# Patient Record
Sex: FEMALE | Race: WHITE | NOT HISPANIC OR LATINO | Employment: PART TIME | URBAN - METROPOLITAN AREA
[De-identification: names, ages, dates, MRNs, and addresses within clinical notes are randomized per-mention and may not be internally consistent; named-entity substitution may affect disease eponyms.]

---

## 2017-01-20 ENCOUNTER — ALLSCRIPTS OFFICE VISIT (OUTPATIENT)
Dept: OTHER | Facility: OTHER | Age: 57
End: 2017-01-20

## 2017-05-12 ENCOUNTER — ALLSCRIPTS OFFICE VISIT (OUTPATIENT)
Dept: OTHER | Facility: OTHER | Age: 57
End: 2017-05-12

## 2017-08-04 ENCOUNTER — ALLSCRIPTS OFFICE VISIT (OUTPATIENT)
Dept: OTHER | Facility: OTHER | Age: 57
End: 2017-08-04

## 2017-11-03 ENCOUNTER — ALLSCRIPTS OFFICE VISIT (OUTPATIENT)
Dept: OTHER | Facility: OTHER | Age: 57
End: 2017-11-03

## 2018-01-10 NOTE — PSYCH
Psych Med Mgmt    Appearance: was calm and cooperative  Observed mood: mood appropriate  Observed mood: affect appropriate  Speech: a normal rate  Thought processes: coherent/organized  Hallucinations: no hallucinations present  Thought Content: no delusions  Abnormal Thoughts: The patient has no suicidal thoughts and no homicidal thoughts  Orientation: The patient is oriented to person, place and time  Recent and Remote Memory: short term memory intact and long term memory intact  Attention Span And Concentration: concentration intact  Insight: Insight intact  Judgment: Her judgment was intact  Treatment Recommendations: Follow up in 3-4 months  Same meds  The patient has been filling controlled [facility]       She reports normal appetite, normal energy level and no weight change  Mood is stable  Work is going well  No new concerns  No med side effects  Continue current meds  Follow up iin 3-4 months  Vitals  Signs   Recorded: 41EYH5594 15:62CU   Systolic: 457  Diastolic: 74  Height: 5 ft 8 in  Weight: 184 lb   BMI Calculated: 27 98  BSA Calculated: 1 97    DSM    Provisional Diagnosis: Major Depression  ADD  GAF--70  Assessment    1  ADD (attention deficit disorder) without hyperactivity (314 00) (F90 0)   2  Depression with anxiety (300 4) (F41 8)    Plan    1  Methylphenidate HCl - 20 MG Oral Tablet (Ritalin); TAKE 1 TABLET 4 TIMES   DAILY    2  Citalopram Hydrobromide 10 MG Oral Tablet (CeleXA); TAKE 1 TABLET DAILY    3  ALPRAZolam 2 MG Oral Tablet (Xanax); TAKE 1 TABLET Other QID and 1 PRN    Active Problems    1  ADD (attention deficit disorder) without hyperactivity (314 00) (F90 0)   2  Chronic depression (311) (F32 9)   3  Depression with anxiety (300 4) (F41 8)    Allergies    1  No Known Drug Allergies    Current Meds   1   ALPRAZolam 2 MG Oral Tablet; TAKE 1 TABLET Other QID and 1 PRN; Therapy: 63BYP9313 to (Evaluate:01Nov2016); Last Rx:18Ejd9071 Ordered   2  Citalopram Hydrobromide 10 MG Oral Tablet; TAKE 1 TABLET DAILY; Therapy: 61TTC2220 to (Evaluate:01Nov2016); Last Rx:59Dce9179 Ordered   3  Methylphenidate HCl - 20 MG Oral Tablet; TAKE 1 TABLET 4 TIMES DAILY; Therapy: 49FNP7137 to (Evaluate:02Sep2016); Last Rx:50Tsp1813 Ordered    Family Psych History  Mother    1  Family history of Anxiety and depression  Father    2  Family history of Bipolar affective    Social History    · Current every day smoker (305 1) (F17 200)   · Single    End of Encounter Meds    1  Methylphenidate HCl - 20 MG Oral Tablet (Ritalin); TAKE 1 TABLET 4 TIMES DAILY; Therapy: 14WPD2041 to (Evaluate:27Nov2016); Last Rx:28Oct2016 Ordered    2  Citalopram Hydrobromide 10 MG Oral Tablet (CeleXA); TAKE 1 TABLET DAILY; Therapy: 14TFX3964 to (Evaluate:01Nov2016); Last Rx:03Cqb8539 Ordered    3  ALPRAZolam 2 MG Oral Tablet (Xanax); TAKE 1 TABLET Other QID and 1 PRN; Therapy: 09RFM6263 to (Evaluate:26Jan2017);  Last Rx:28Oct2016 Ordered    Signatures   Electronically signed by : Silvestre Schwartz; Oct 28 2016  2:47PM EST                       (Author)    Electronically signed by : Riccardo Martel MD; Oct 31 2016  3:40PM EST

## 2018-01-12 NOTE — PSYCH
Psych Med Mgmt    Appearance: was calm and cooperative and good eye contact  Observed mood: mood appropriate  Observed mood: affect was constricted, but affect appropriate  Speech: a normal rate  Thought processes: coherent/organized  Hallucinations: no hallucinations present  Thought Content: no delusions  Abnormal Thoughts: The patient has no suicidal thoughts and no homicidal thoughts  Orientation: The patient is oriented to person, place and time  Recent and Remote Memory: short term memory intact and long term memory intact  Attention Span And Concentration: concentration intact  Insight: Insight intact  Judgment: Her judgment was intact  Treatment Recommendations: Follow up in 4 months    Same meds  She reports normal appetite, normal energy level and no weight change  Doing very well  No new clinical issues or concerns  Mood is good  Off Celexa for Daylight Savings Time  Will restart in Fall  Continue current meds  Vitals  Signs [Data Includes: Current Encounter]   Recorded: 31CDS7179 74:56RM   Systolic: 987  Diastolic: 76  Height: 5 ft 8 in  Weight: 184 lb   BMI Calculated: 27 98  BSA Calculated: 1 97    DSM    Provisional Diagnosis: Major Depression  ADD  GAF--70  Assessment    1  ADD (attention deficit disorder) without hyperactivity (314 00) (F90 0)   2  Depression with anxiety (300 4) (F41 8)    Plan    1  Methylphenidate HCl - 20 MG Oral Tablet (Ritalin); TAKE 1 TABLET 4 TIMES   DAILY    2  ALPRAZolam 2 MG Oral Tablet (Xanax); TAKE 1 TABLET Other QID and 1 PRN    Active Problems    1  ADD (attention deficit disorder) without hyperactivity (314 00) (F90 0)   2  Chronic depression (311) (F32 9)   3  Depression with anxiety (300 4) (F41 8)    Allergies    1  No Known Drug Allergies    Current Meds   1  ALPRAZolam 2 MG Oral Tablet; TAKE 1 TABLET Other QID and 1 PRN; Therapy: 37MAM1297 to (Evaluate:14Apr2016); Last Rx:15Jan2016 Ordered   2   Citalopram Hydrobromide 10 MG Oral Tablet; TAKE 1 TABLET DAILY; Therapy: 64KYV8739 to (Evaluate:14Apr2016); Last Rx:15Jan2016 Ordered   3  Methylphenidate HCl - 20 MG Oral Tablet; TAKE 1 TABLET 4 TIMES DAILY; Therapy: 69LMO1917 to (Evaluate:84Tvw9047); Last Rx:15Jan2016 Ordered    Family Psych History    1  Family history of Anxiety and depression    2  Family history of Bipolar affective    Social History    · Current every day smoker (305 1) (F17 200)   · Single    End of Encounter Meds    1  Methylphenidate HCl - 20 MG Oral Tablet (Ritalin); TAKE 1 TABLET 4 TIMES DAILY; Therapy: 36AXI4631 to (Evaluate:62Eyn5097); Last Rx:15Apr2016 Ordered    2  Citalopram Hydrobromide 10 MG Oral Tablet (CeleXA); TAKE 1 TABLET DAILY; Therapy: 03CBQ6305 to (Evaluate:89Tvk7567); Last Rx:15Jan2016 Ordered    3  ALPRAZolam 2 MG Oral Tablet (Xanax); TAKE 1 TABLET Other QID and 1 PRN; Therapy: 62FMI6120 to (Evaluate:39Eqn5794); Last Rx:15Apr2016 Ordered    Signatures   Electronically signed by : Lennox Kiel;  Apr 15 2016  4:35PM EST                       (Author)    Electronically signed by : Brunilda Vidal MD; Apr 18 2016  3:32PM EST

## 2018-01-12 NOTE — PSYCH
Psych Med Mgmt    Appearance: was calm and cooperative  Observed mood: mood appropriate  Observed mood: affect appropriate  Speech: a normal rate  Thought processes: coherent/organized  Hallucinations: no hallucinations present  Thought Content: no delusions  Abnormal Thoughts: The patient has no suicidal thoughts  Orientation: The patient is oriented to person, place and time  Recent and Remote Memory: short term memory intact and long term memory intact  Attention Span And Concentration: concentration intact  Insight: Insight intact  Judgment: Her judgment was intact  Treatment Recommendations: Follow up in 3 months  She reports normal appetite, normal energy level and no weight change  Doing very well  Taking antidepressant regularly and is more interested, less sullen and more active at work and home  No med side effects  No suicidal thoughts  Follow up in 3 months  Vitals  Signs [Data Includes: Current Encounter]   Recorded: 48IHH6330 70:41AO   Systolic: 733  Diastolic: 76  Height: 5 ft 8 in  Weight: 184 lb   BMI Calculated: 27 98  BSA Calculated: 1 97    DSM    Provisional Diagnosis: Major Depression with no Psychosis  ADD    GAF--70  Assessment    1  ADD (attention deficit disorder) without hyperactivity (314 00) (F90 0)   2  Depression with anxiety (300 4) (F41 8)    Plan    1  Methylphenidate HCl - 20 MG Oral Tablet (Ritalin)   2  Methylphenidate HCl - 20 MG Oral Tablet (Ritalin); TAKE 1 TABLET 4 TIMES   DAILY    3  Citalopram Hydrobromide 10 MG Oral Tablet (CeleXA); TAKE 1 TABLET DAILY    4  ALPRAZolam 2 MG Oral Tablet (Xanax); TAKE 1 TABLET Other QID and 1 PRN    Active Problems    1  ADD (attention deficit disorder) without hyperactivity (314 00) (F90 0)   2  Chronic depression (311) (F32 9)   3  Depression with anxiety (300 4) (F41 8)    Allergies    1  No Known Drug Allergies    Current Meds   1   ALPRAZolam 2 MG Oral Tablet; TAKE 1 TABLET Other QID and 1 PRN;   Therapy: 46VWR2496 to (Evaluate:19Jan2016); Last Rx:21Oct2015 Ordered   2  Citalopram Hydrobromide 10 MG Oral Tablet; TAKE 1 TABLET DAILY; Therapy: 47CTY8956 to (Evaluate:19Jan2016); Last Rx:21Oct2015 Ordered   3  Methylphenidate HCl - 20 MG Oral Tablet; TAKE 1 TABLET 4 TIMES DAILY; Therapy: 57PFA0998 to (Evaluate:20Nov2015); Last Rx:21Oct2015 Ordered   4  Methylphenidate HCl - 20 MG Oral Tablet; TAKE 1 TABLET 4 TIMES DAILY; Therapy: 64ITX0177 to (Evaluate:20Nov2015); Last Rx:21Oct2015 Ordered    Family Psych History    1  Family history of Anxiety and depression    2  Family history of Bipolar affective    Social History    · Current every day smoker (305 1) (F17 200)   · Single    End of Encounter Meds    1  Methylphenidate HCl - 20 MG Oral Tablet (Ritalin); TAKE 1 TABLET 4 TIMES DAILY; Therapy: 13TCW2153 to (Evaluate:24Vhj5544); Last Rx:15Jan2016 Ordered    2  Citalopram Hydrobromide 10 MG Oral Tablet (CeleXA); TAKE 1 TABLET DAILY; Therapy: 32ADN3720 to (Evaluate:14Apr2016); Last Rx:15Jan2016 Ordered    3  ALPRAZolam 2 MG Oral Tablet (Xanax); TAKE 1 TABLET Other QID and 1 PRN; Therapy: 87JQK7025 to (Evaluate:14Apr2016);  Last Rx:15Jan2016 Ordered    Signatures   Electronically signed by : Shun Phillip; Thomas 15 2016  5:10PM EST                       (Author)    Electronically signed by : Gladys Ansari MD; Jan 18 2016  3:29PM EST

## 2018-01-12 NOTE — PSYCH
Psych Med Mgmt    Appearance: was calm and cooperative and good eye contact  Observed mood: mood appropriate  Observed mood: affect appropriate  Speech: a normal rate  Thought processes: coherent/organized  Hallucinations: no hallucinations present  Thought Content: no delusions  Abnormal Thoughts: The patient has no suicidal thoughts and no homicidal thoughts  Orientation: The patient is oriented to person, place and time  Recent and Remote Memory: short term memory intact and long term memory intact  Attention Span And Concentration: concentration intact  Insight: Insight intact  Judgment: Her judgment was intact  Treatment Recommendations: Follow up in 3 months  Same meds  She reports normal appetite, normal energy level and no weight change  Doing well  Works daily  No issues  Tolerates meds well  No new clinical issues or concerns  Tolerates all meds well  No overuse noted  Continue current treatment  Vitals  Signs   Recorded: 78GJN5977 34:49AU   Systolic: 586  Diastolic: 74  Height: 5 ft 8 in  Weight: 184 lb   BMI Calculated: 27 98  BSA Calculated: 1 97    DSM    Provisional Diagnosis: Major Depression  ADD  GAF--70  Assessment    1  Chronic depression (311) (F32 9)    Plan    1  Methylphenidate HCl - 20 MG Oral Tablet (Ritalin); TAKE 1 TABLET 4 TIMES   DAILY    2  Citalopram Hydrobromide 10 MG Oral Tablet (CeleXA); TAKE 1 TABLET DAILY    3  ALPRAZolam 2 MG Oral Tablet (Xanax); TAKE 1 TABLET Other QID and 1 PRN    Active Problems    1  ADD (attention deficit disorder) without hyperactivity (314 00) (F90 0)   2  Chronic depression (311) (F32 9)   3  Depression with anxiety (300 4) (F41 8)    Allergies    1  No Known Drug Allergies    Current Meds   1  ALPRAZolam 2 MG Oral Tablet; TAKE 1 TABLET Other QID and 1 PRN; Therapy: 37URT1255 to (Evaluate:75Kyh3256); Last Rx:15Apr2016 Ordered   2  Citalopram Hydrobromide 10 MG Oral Tablet; TAKE 1 TABLET DAILY;    Therapy: 99EBU4685 to (Evaluate:14Apr2016); Last Rx:15Jan2016 Ordered   3  Methylphenidate HCl - 20 MG Oral Tablet; TAKE 1 TABLET 4 TIMES DAILY; Therapy: 89WYG0175 to (Evaluate:97Zbv4138); Last Rx:15Apr2016 Ordered    Family Psych History  Mother    1  Family history of Anxiety and depression  Father    2  Family history of Bipolar affective    Social History    · Current every day smoker (305 1) (F17 200)   · Single    End of Encounter Meds    1  Methylphenidate HCl - 20 MG Oral Tablet (Ritalin); TAKE 1 TABLET 4 TIMES DAILY; Therapy: 73BYT5597 to (Evaluate:07Yzs0108); Last Rx:51Fij1862 Ordered    2  Citalopram Hydrobromide 10 MG Oral Tablet (CeleXA); TAKE 1 TABLET DAILY; Therapy: 86VOW6721 to (Evaluate:01Nov2016); Last Rx:79Pmo0262 Ordered    3  ALPRAZolam 2 MG Oral Tablet (Xanax); TAKE 1 TABLET Other QID and 1 PRN; Therapy: 21JWM4043 to (Evaluate:01Nov2016); Last Rx:93Kfl4953 Ordered    Future Appointments    Date/Time Provider Specialty Site   10/28/2016 02:30 PM Agus Mckenzie, [facility]  Psychiatry [facility] DR BROOKE ARGUELLES     Signatures   Electronically signed by : YASMANY Cao;  Aug  3 2016  4:55PM EST                       (Author)    Electronically signed by : Sally Galeano MD; Aug  9 2016  3:18PM EST

## 2018-01-15 NOTE — PSYCH
Psych Med Mgmt    Appearance: was calm and cooperative and good eye contact  Observed mood: mood appropriate  Observed mood: affect appropriate  Speech: a normal rate  Thought processes: coherent/organized  Hallucinations: no hallucinations present  Thought Content: no delusions  Abnormal Thoughts: The patient has no suicidal thoughts and no homicidal thoughts  Orientation: The patient is oriented to person, place and time  Recent and Remote Memory: short term memory intact and long term memory intact  Attention Span And Concentration: concentration intact  Insight: Insight intact  Judgment: Her judgment was intact  Treatment Recommendations: Follow up in 3 months  Same meds  The patient has been filling controlled prescriptions on time as prescribed to [facility] program       She reports normal appetite and normal energy level  Mood remains stable  Has no new clinical issues or concerns  Tolerates meds well and is functioning at her optimum with current meds  Anxiety is under good control with Xanax  Same meds  Follow up in 3 months  Vitals  Signs   Recorded: 84SZF8338 82:67HQ   Systolic: 153  Diastolic: 70  Height: 5 ft 8 in  Weight: 184 lb   BMI Calculated: 27 98  BSA Calculated: 1 97    DSM    Provisional Diagnosis: Major Depression Recurrent  GAF--70  Assessment    1  Depression with anxiety (300 4) (F41 8)    Plan    1  Methylphenidate HCl - 20 MG Oral Tablet (Ritalin); TAKE 1 TABLET 4 TIMES   DAILY    2  From  Citalopram Hydrobromide 10 MG Oral Tablet TAKE 1 TABLET DAILY To   Citalopram Hydrobromide 10 MG Oral Tablet (CeleXA) TAKE 1 TABLET Daily May take 2 if   needed    3  ALPRAZolam 2 MG Oral Tablet (Xanax); TAKE 1 TABLET Other QID and 1 PRN    Active Problems    1  ADD (attention deficit disorder) without hyperactivity (314 00) (F98 8)   2  Chronic depression (311) (F32 9)   3   Depression with anxiety (300 4) (F41 8)    Allergies    1  No Known Drug Allergies    Current Meds   1  ALPRAZolam 2 MG Oral Tablet; TAKE 1 TABLET Other QID and 1 PRN; Therapy: 09WZX1518 to (Evaluate:10Aug2017); Last Rx:37Cob6294 Ordered   2  Citalopram Hydrobromide 10 MG Oral Tablet; TAKE 1 TABLET DAILY; Therapy: 91FDC5081 to (Evaluate:10Aug2017); Last Rx:19Lju9505 Ordered   3  Methylphenidate HCl - 20 MG Oral Tablet; TAKE 1 TABLET 4 TIMES DAILY; Therapy: 92EDB2233 to (Evaluate:11Jun2017); Last Rx:77Odh8083 Ordered    Family Psych History  Mother    1  Family history of Anxiety and depression  Father    2  Family history of Bipolar affective    Social History    · Current every day smoker (305 1) (F17 200)   · Single    End of Encounter Meds    1  Methylphenidate HCl - 20 MG Oral Tablet (Ritalin); TAKE 1 TABLET 4 TIMES DAILY; Therapy: 93IRJ2642 to (Evaluate:03Sep2017); Last Rx:65Hoh6117 Ordered    2  Citalopram Hydrobromide 10 MG Oral Tablet (CeleXA); TAKE 1 TABLET Daily May take 2 if   needed; Therapy: 72PBW7333 to (PJHYFI:41AKX4464); Last Rx:03Pqd8156 Ordered    3  ALPRAZolam 2 MG Oral Tablet (Xanax); TAKE 1 TABLET Other QID and 1 PRN; Therapy: 25XGA0798 to (Evaluate:02Nov2017); Last Rx:51Fmg4866 Ordered    Signatures   Electronically signed by : Miriam Crystal;  Aug  4 2017  2:45PM EST                       (Author)    Electronically signed by : Shara Song MD; Aug  7 2017  2:02PM EST

## 2018-01-16 NOTE — PSYCH
Psych Med Mgmt    Appearance: was calm and cooperative and good eye contact  Observed mood: mood appropriate  Observed mood: affect appropriate  Speech: a normal rate  Thought processes: coherent/organized  Hallucinations: no hallucinations present  Thought Content: no delusions  Abnormal Thoughts: The patient has no suicidal thoughts and no homicidal thoughts  Orientation: The patient is oriented to person, place and time  Recent and Remote Memory: short term memory intact and long term memory intact  Attention Span And Concentration: concentration intact  Insight: Insight intact  Judgment: Her judgment was intact  Treatment Recommendations: Follow up in 3 months  Same meds  The patient has been filling controlled prescriptions on time as prescribed to [facility] program       She reports normal appetite, normal energy level and no weight change  Enjoyed her holidays  Had been baking cookies for a [employer]  Very rewarding  No new concerns  Continue current treatment  Vitals  Signs   Recorded: 64JRR0365 45:72UD   Systolic: 496  Diastolic: 74  Height: 5 ft 8 in  Weight: 184 lb   BMI Calculated: 27 98  BSA Calculated: 1 97    DSM    Provisional Diagnosis: Major Depression-Recurrent    ADD  GAF--65  Assessment    1  Depression with anxiety (300 4) (F41 8)    Plan    1  Methylphenidate HCl - 20 MG Oral Tablet (Ritalin); TAKE 1 TABLET 4 TIMES   DAILY    2  Citalopram Hydrobromide 10 MG Oral Tablet (CeleXA); TAKE 1 TABLET DAILY    3  ALPRAZolam 2 MG Oral Tablet (Xanax); TAKE 1 TABLET Other QID and 1 PRN    Active Problems    1  ADD (attention deficit disorder) without hyperactivity (314 00) (F98 8)   2  Chronic depression (311) (F32 9)   3  Depression with anxiety (300 4) (F41 8)    Allergies    1  No Known Drug Allergies    Current Meds   1  ALPRAZolam 2 MG Oral Tablet; TAKE 1 TABLET Other QID and 1 PRN;    Therapy: 29CXE4125 to (Evaluate:26Jan2017); Last Rx:28Oct2016 Ordered   2  Citalopram Hydrobromide 10 MG Oral Tablet; TAKE 1 TABLET DAILY; Therapy: 50DNE7893 to (Evaluate:01Nov2016); Last Rx:01Cbu0801 Ordered   3  Methylphenidate HCl - 20 MG Oral Tablet; TAKE 1 TABLET 4 TIMES DAILY; Therapy: 59ZTD0263 to (Evaluate:27Nov2016); Last Rx:28Oct2016 Ordered    Family Psych History  Mother    1  Family history of Anxiety and depression  Father    2  Family history of Bipolar affective    Social History    · Current every day smoker (305 1) (F17 200)   · Single    End of Encounter Meds    1  Methylphenidate HCl - 20 MG Oral Tablet (Ritalin); TAKE 1 TABLET 4 TIMES DAILY; Therapy: 85KKX6190 to (Evaluate:14Vap8825); Last Rx:20Jan2017 Ordered    2  Citalopram Hydrobromide 10 MG Oral Tablet (CeleXA); TAKE 1 TABLET DAILY; Therapy: 77KEC0844 to (Evaluate:20Apr2017); Last Rx:20Jan2017 Ordered    3  ALPRAZolam 2 MG Oral Tablet (Xanax); TAKE 1 TABLET Other QID and 1 PRN; Therapy: 62VNM6305 to (Evaluate:20Apr2017);  Last Rx:20Jan2017 Ordered    Signatures   Electronically signed by : Cyndi Holt; Jan 20 2017  2:43PM EST                       (Author)    Electronically signed by : Rio Vasquez MD; Jan 23 2017  4:44PM EST

## 2018-01-16 NOTE — PSYCH
Psych Med Mgmt    Appearance: was calm and cooperative and good eye contact  Observed mood: mood appropriate  Observed mood: affect appropriate  Speech: a normal rate  Thought processes: coherent/organized  Hallucinations: no hallucinations present  Thought Content: no delusions  Abnormal Thoughts: The patient has no suicidal thoughts and no homicidal thoughts  Orientation: The patient is oriented to person, place and time  Recent and Remote Memory: short term memory intact and long term memory intact  Attention Span And Concentration: concentration intact  Insight: Insight intact  Judgment: Her judgment was intact  Treatment Recommendations: Follow up in 4 months  Same meds  The patient has been filling controlled prescriptions on time as prescribed to [facility] program       She reports normal appetite, normal energy level and no weight change  Mood remains stable  Has had no episodes of mood lability of instability or depression  No episodes of anxiety or poor concentration  Has been functioning at her optimun on current meds  Follow up in 4 months  Vitals  Signs   Recorded: 76DCR4436 07:72WE   Systolic: 315  Diastolic: 70  Height: 5 ft 8 in  Weight: 184 lb   BMI Calculated: 27 98  BSA Calculated: 1 97    DSM    Provisional Diagnosis: Major Depression-Recurrent  ADD  GAF--70  Assessment    1  Depression with anxiety (300 4) (F41 8)    Plan    1  Methylphenidate HCl - 20 MG Oral Tablet (Ritalin); TAKE 1 TABLET 4 TIMES   DAILY    2  Citalopram Hydrobromide 10 MG Oral Tablet (CeleXA); TAKE 1 TABLET Daily   May take 2 if needed    3  ALPRAZolam 2 MG Oral Tablet (Xanax); TAKE 1 TABLET Other QID and 1 PRN    Active Problems    1  ADD (attention deficit disorder) without hyperactivity (314 00) (F98 8)   2  Chronic depression (311) (F32 9)   3  Depression with anxiety (300 4) (F41 8)    Allergies    1   No Known Drug Allergies    Current Meds   1  ALPRAZolam 2 MG Oral Tablet; TAKE 1 TABLET Other QID and 1 PRN; Therapy: 09OEM0766 to (Evaluate:02Nov2017); Last Rx:04Aug2017 Ordered   2  Citalopram Hydrobromide 10 MG Oral Tablet; TAKE 1 TABLET Daily May take 2 if needed; Therapy: 64OJB9805 to (OUQBVNLR:73AYH1890); Last Rx:96Qxe2277 Ordered   3  Methylphenidate HCl - 20 MG Oral Tablet; TAKE 1 TABLET 4 TIMES DAILY; Therapy: 31JLJ8085 to (Evaluate:78Jdd3403); Last Rx:83Jfd3872 Ordered    Family Psych History  Mother    1  Family history of Anxiety and depression  Father    2  Family history of Bipolar affective    Social History    · Current every day smoker (305 1) (F17 200)   · Single    End of Encounter Meds    1  Methylphenidate HCl - 20 MG Oral Tablet (Ritalin); TAKE 1 TABLET 4 TIMES DAILY; Therapy: 62GEH9617 to (Evaluate:59Qxn4340); Last Rx:03Nov2017 Ordered    2  Citalopram Hydrobromide 10 MG Oral Tablet (CeleXA); TAKE 1 TABLET Daily May take 2 if   needed; Therapy: 63WOZ0224 to (AJPTDZIK:35SUJ0697); Last Rx:04Aug2017 Ordered    3  ALPRAZolam 2 MG Oral Tablet (Xanax); TAKE 1 TABLET Other QID and 1 PRN; Therapy: 97YAM3376 to (Evaluate:94Izc5946);  Last TS:41XPX6018 Ordered    Signatures   Electronically signed by : Titus Martinez; Nov  3 2017  2:53PM EST                       (Author)    Electronically signed by : Lavinia Cho MD; Nov 5 2017 12:52PM EST

## 2018-01-17 NOTE — PSYCH
Psych Med Mgmt    Appearance: was calm and cooperative and good eye contact  Observed mood: was dysphoric  Observed mood: affect appropriate  Speech: a normal rate  Thought processes: coherent/organized  Hallucinations: no hallucinations present  Thought Content: no delusions  Abnormal Thoughts: The patient has no suicidal thoughts and no homicidal thoughts  Orientation: The patient is oriented to person, place and time  Recent and Remote Memory: short term memory intact and long term memory intact  Attention Span And Concentration: concentration intact  Insight: Insight intact  Judgment: Her judgment was intact  Treatment Recommendations: Follow up in 3 months  Same meds  The patient has been filling controlled prescriptions on time as prescribed to [facility] program       She reports normal appetite, normal energy level and no weight change  Some days she will increase dose of Celexa to help decrease Depression on anniversary dates of father's death which occurred 10 years ago  Overall, doing well with current meds  Focus is better with Ritalin and anxiety under control with Xanax  Takes meds dilligently and without abuse  Vitals  Signs   Recorded: [id number] 77:54BI   Systolic: 510  Diastolic: 70  Height: 5 ft 8 in  Weight: 184 lb   BMI Calculated: 27 98  BSA Calculated: 1 97    DSM    Provisional Diagnosis: Major Depression Recurrent  ADD  GAF--70  Assessment    1  Depression with anxiety (300 4) (F41 8)   2  ADD (attention deficit disorder) without hyperactivity (314 00) (F98 8)    Plan    1  Methylphenidate HCl - 20 MG Oral Tablet (Ritalin); TAKE 1 TABLET 4 TIMES   DAILY    2  Citalopram Hydrobromide 10 MG Oral Tablet (CeleXA); TAKE 1 TABLET DAILY    3  ALPRAZolam 2 MG Oral Tablet (Xanax); TAKE 1 TABLET Other QID and 1 PRN    Active Problems    1   ADD (attention deficit disorder) without hyperactivity (314 00) (F98 8) 2  Chronic depression (311) (F32 9)   3  Depression with anxiety (300 4) (F41 8)    Allergies    1  No Known Drug Allergies    Current Meds   1  ALPRAZolam 2 MG Oral Tablet; TAKE 1 TABLET Other QID and 1 PRN; Therapy: [id number] to (Evaluate:20Apr2017); Last Rx:20Jan2017 Ordered   2  Citalopram Hydrobromide 10 MG Oral Tablet; TAKE 1 TABLET DAILY; Therapy: [id number] to (Evaluate:20Apr2017); Last Rx:20Jan2017 Ordered   3  Methylphenidate HCl - 20 MG Oral Tablet; TAKE 1 TABLET 4 TIMES DAILY; Therapy: [id number] to (Evaluate:04Gvc7600); Last Rx:20Jan2017 Ordered    Family Psych History  Mother    1  Family history of Anxiety and depression  Father    2  Family history of Bipolar affective    Social History    · Current every day smoker (305 1) (F17 200)   · Single    End of Encounter Meds    1  Methylphenidate HCl - 20 MG Oral Tablet (Ritalin); TAKE 1 TABLET 4 TIMES DAILY; Therapy: [id number] to (Evaluate:11Jun2017); Last Rx:12May2017 Ordered    2  Citalopram Hydrobromide 10 MG Oral Tablet (CeleXA); TAKE 1 TABLET DAILY; Therapy: [id number] to (Evaluate:10Aug2017); Last Rx:12May2017 Ordered    3  ALPRAZolam 2 MG Oral Tablet (Xanax); TAKE 1 TABLET Other QID and 1 PRN; Therapy: [id number] to (Evaluate:10Aug2017);  Last Rx:[id number] Ordered    Signatures   Electronically signed by : Juli Painting; May 12 2017  2:02PM EST                       (Author)    Electronically signed by : Lyndon May MD; May 15 2017  4:51PM EST

## 2018-01-22 VITALS
HEIGHT: 68 IN | SYSTOLIC BLOOD PRESSURE: 116 MMHG | DIASTOLIC BLOOD PRESSURE: 74 MMHG | WEIGHT: 184 LBS | BODY MASS INDEX: 27.89 KG/M2

## 2018-01-22 VITALS
SYSTOLIC BLOOD PRESSURE: 116 MMHG | HEIGHT: 68 IN | WEIGHT: 184 LBS | DIASTOLIC BLOOD PRESSURE: 70 MMHG | BODY MASS INDEX: 27.89 KG/M2

## 2018-01-22 VITALS
SYSTOLIC BLOOD PRESSURE: 114 MMHG | BODY MASS INDEX: 27.89 KG/M2 | DIASTOLIC BLOOD PRESSURE: 70 MMHG | HEIGHT: 68 IN | WEIGHT: 184 LBS

## 2018-01-22 VITALS
HEIGHT: 68 IN | BODY MASS INDEX: 27.89 KG/M2 | SYSTOLIC BLOOD PRESSURE: 116 MMHG | WEIGHT: 184 LBS | DIASTOLIC BLOOD PRESSURE: 70 MMHG

## 2018-02-05 ENCOUNTER — TELEPHONE (OUTPATIENT)
Dept: PSYCHIATRY | Facility: CLINIC | Age: 58
End: 2018-02-05

## 2018-02-09 RX ORDER — CITALOPRAM 10 MG/1
1 TABLET ORAL DAILY
COMMUNITY
End: 2018-02-23 | Stop reason: SDUPTHER

## 2018-02-09 RX ORDER — ALPRAZOLAM 2 MG/1
2 TABLET ORAL 4 TIMES DAILY
Qty: 150 TABLET | Refills: 2 | Status: CANCELLED | OUTPATIENT
Start: 2018-02-09

## 2018-02-09 RX ORDER — METHYLPHENIDATE HYDROCHLORIDE 20 MG/1
1 TABLET ORAL 4 TIMES DAILY
COMMUNITY
Start: 2015-08-05 | End: 2018-02-23 | Stop reason: SDUPTHER

## 2018-02-09 RX ORDER — ALPRAZOLAM 2 MG/1
2 TABLET ORAL 4 TIMES DAILY
COMMUNITY
Start: 2015-08-05 | End: 2018-05-02 | Stop reason: SDUPTHER

## 2018-02-23 ENCOUNTER — OFFICE VISIT (OUTPATIENT)
Dept: PSYCHIATRY | Facility: CLINIC | Age: 58
End: 2018-02-23

## 2018-02-23 DIAGNOSIS — F98.8 ATTENTION DEFICIT DISORDER (ADD) WITHOUT HYPERACTIVITY: ICD-10-CM

## 2018-02-23 DIAGNOSIS — F33.41 RECURRENT MAJOR DEPRESSIVE DISORDER, IN PARTIAL REMISSION (HCC): Primary | ICD-10-CM

## 2018-02-23 PROBLEM — Z09 FOLLOW UP: Status: ACTIVE | Noted: 2018-02-23

## 2018-02-23 PROCEDURE — 99213 OFFICE O/P EST LOW 20 MIN: CPT | Performed by: NURSE PRACTITIONER

## 2018-02-23 RX ORDER — METHYLPHENIDATE HYDROCHLORIDE 20 MG/1
20 TABLET ORAL 4 TIMES DAILY
Qty: 120 TABLET | Refills: 0 | Status: SHIPPED | OUTPATIENT
Start: 2018-02-23 | End: 2018-02-23 | Stop reason: SDUPTHER

## 2018-02-23 RX ORDER — CITALOPRAM 10 MG/1
10 TABLET ORAL DAILY
Qty: 30 TABLET | Refills: 5 | Status: SHIPPED | OUTPATIENT
Start: 2018-02-23 | End: 2018-05-18 | Stop reason: SDUPTHER

## 2018-02-23 RX ORDER — METHYLPHENIDATE HYDROCHLORIDE 20 MG/1
20 TABLET ORAL 4 TIMES DAILY
Qty: 120 TABLET | Refills: 0 | Status: SHIPPED | OUTPATIENT
Start: 2018-02-23 | End: 2018-05-18 | Stop reason: SDUPTHER

## 2018-02-23 NOTE — PSYCH
Loretta Treviño is a 61 y o  female who presents today for follow up  The the patient has a history of depression, anxiety and mood instability  Reports she is a good busy  She has energy, interest and motivation  Work and family are all good  Follow up in 3 months  Same meds  Physical Exam   Constitutional: She is oriented to person, place, and time  She appears well-developed and well-nourished  Neurological: She is alert and oriented to person, place, and time  Skin: Skin is warm and dry  Psychiatric: She has a normal mood and affect  Her speech is normal and behavior is normal  Judgment and thought content normal  Cognition and memory are normal    Continue current treatment           MDM/Plan Same meds    Continue medications    Follow up in 3 months

## 2018-05-02 ENCOUNTER — TELEPHONE (OUTPATIENT)
Dept: PSYCHIATRY | Facility: CLINIC | Age: 58
End: 2018-05-02

## 2018-05-02 DIAGNOSIS — F33.41 RECURRENT MAJOR DEPRESSIVE DISORDER, IN PARTIAL REMISSION (HCC): Primary | ICD-10-CM

## 2018-05-02 RX ORDER — ALPRAZOLAM 2 MG/1
2 TABLET ORAL 4 TIMES DAILY
Qty: 120 TABLET | Refills: 1 | Status: SHIPPED | OUTPATIENT
Start: 2018-05-02 | End: 2018-05-18 | Stop reason: SDUPTHER

## 2018-05-18 ENCOUNTER — OFFICE VISIT (OUTPATIENT)
Dept: PSYCHIATRY | Facility: CLINIC | Age: 58
End: 2018-05-18
Payer: COMMERCIAL

## 2018-05-18 DIAGNOSIS — F33.41 RECURRENT MAJOR DEPRESSIVE DISORDER, IN PARTIAL REMISSION (HCC): Primary | ICD-10-CM

## 2018-05-18 DIAGNOSIS — F90.0 ATTENTION DEFICIT HYPERACTIVITY DISORDER (ADHD), PREDOMINANTLY INATTENTIVE TYPE: ICD-10-CM

## 2018-05-18 PROCEDURE — 99213 OFFICE O/P EST LOW 20 MIN: CPT | Performed by: NURSE PRACTITIONER

## 2018-05-18 RX ORDER — CITALOPRAM 10 MG/1
10 TABLET ORAL DAILY
Qty: 30 TABLET | Refills: 5 | Status: SHIPPED | OUTPATIENT
Start: 2018-05-18 | End: 2018-08-21 | Stop reason: SDUPTHER

## 2018-05-18 RX ORDER — METHYLPHENIDATE HYDROCHLORIDE 20 MG/1
20 TABLET ORAL 4 TIMES DAILY
Qty: 120 TABLET | Refills: 0 | Status: SHIPPED | OUTPATIENT
Start: 2018-05-18 | End: 2018-05-18 | Stop reason: SDUPTHER

## 2018-05-18 RX ORDER — ALPRAZOLAM 2 MG/1
TABLET ORAL
Qty: 150 TABLET | Refills: 2 | Status: SHIPPED | OUTPATIENT
Start: 2018-05-18 | End: 2018-08-21 | Stop reason: SDUPTHER

## 2018-05-18 RX ORDER — METHYLPHENIDATE HYDROCHLORIDE 20 MG/1
20 TABLET ORAL 4 TIMES DAILY
Qty: 120 TABLET | Refills: 0 | Status: SHIPPED | OUTPATIENT
Start: 2018-05-18 | End: 2018-08-21 | Stop reason: SDUPTHER

## 2018-05-18 NOTE — PSYCH
PROGRESS NOTE        [address] [facility]      Name and Date of Birth:  Gabriela Vazquez 61 y o  1960    Date of Visit: 05/18/18    SUBJECTIVE:   Doing well with current meds  Work environment is much more stressful since more work put on her with work attrition  Now with more work responsibilities  Current doses of meds seem to keep her work anxiety under fairly good controll  Sleep and appetite are good  No other new concerns  She denies suicidal ideation, intent or plan at present, has no suicidal ideation, intent or plan at present  She denies any auditory hallucinations and visual hallucinations, denies any other delusional thinking, denies any delusional thinking  She denies any side effects from medications    HPI ROS Appetite Changes and Sleep: normal appetite, normal sleep    Review Of Systems:      Constitutional Negative   ENT Negative   Cardiovascular Negative   Respiratory As Noted in HPI   Gastrointestinal Negative   Genitourinary Negative   Musculoskeletal Negative   Integumentary Negative   Neurological Negative   Endocrine Negative   Other Symptoms Negative and None       Laboratory Results: No results found for this or any previous visit  Substance Abuse History:    History   Drug Use    Types: Amphetamines, Benzodiazepines     Comment: Prescribed by provider       Family Psychiatric History:     Family History   Problem Relation Age of Onset    Depression Mother     Anxiety disorder Mother     Depression Father     Anxiety disorder Father        The following portions of the patient's history were reviewed and updated as appropriate: past family history, past medical history, past social history, past surgical history and problem list     Social History     Social History    Marital status: /Civil Union     Spouse name: N/A    Number of children: N/A    Years of education: N/A     Occupational History    Not on file  Social History Main Topics    Smoking status: Former Smoker     Quit date: 2/23/2011    Smokeless tobacco: Never Used    Alcohol use Yes      Comment: Rare use    Drug use: Yes     Types: Amphetamines, Benzodiazepines      Comment: Prescribed by provider    Sexual activity: Not on file     Other Topics Concern    Not on file     Social History Narrative    No narrative on file     Social History     Social History Narrative    No narrative on file        Social History     Tobacco History     Smoking Status  Former Smoker Quit date  2/23/2011    Smokeless Tobacco Use  Never Used          Alcohol History     Alcohol Use Status  Yes Comment  Rare use          Drug Use     Drug Use Status  Yes Types  Amphetamines, Benzodiazepines Comment  Prescribed by provider          Sexual Activity     Sexually Active  Not Asked          Activities of Daily Living    Not Asked                     OBJECTIVE:     Mental Status Evaluation:    Appearance age appropriate, casually dressed   Behavior pleasant, cooperative   Speech normal volume, normal pitch   Mood Stable   Affect Bright   Thought Processes logical   Associations intact associations   Thought Content normal   Perceptual Disturbances: none   Abnormal Thoughts  Risk Potential Suicidal ideation - None  Homicidal ideation - None  Potential for aggression - Yes   Orientation oriented to person, place, time/date and situation   Memory recent and remote memory grossly intact   Cosciousness alert and awake   Attention Span attention span and concentration are age appropriate   Intellect Appears to be of Average Intelligence   Insight age appropriate and improved   Judgement good and improved   Muscle Strength and  Gait muscle strength and tone were normal   Language no difficulty naming common objects   Fund of Knowledge displays adequate knowledge of current events   Pain none   Pain Scale 0       Assessment/Plan:       Diagnoses and all orders for this visit:    Recurrent major depressive disorder, in partial remission (HCC)  -     ALPRAZolam (XANAX) 2 MG tablet; 1 QID and 1 Extra PRN for breakthrough anxiety    Attention deficit hyperactivity disorder (ADHD), predominantly inattentive type          Treatment Recommendations/Precautions:    Continue current medications:    Risks/Benefits      Risks, Benefits And Possible Side Effects Of Medications:    Risks, benefits, and possible side effects of medications explained to patient and patient verbalizes understanding and agreement for treatment  Controlled Medication Discussion: Takes all meds as prescribed      Not applicable    Psychotherapy Provided:     Individual psychotherapy provided: No

## 2018-08-20 ENCOUNTER — TELEPHONE (OUTPATIENT)
Dept: PSYCHIATRY | Facility: CLINIC | Age: 58
End: 2018-08-20

## 2018-08-21 DIAGNOSIS — F33.41 RECURRENT MAJOR DEPRESSIVE DISORDER, IN PARTIAL REMISSION (HCC): ICD-10-CM

## 2018-08-21 RX ORDER — CITALOPRAM 10 MG/1
10 TABLET ORAL DAILY
Qty: 90 TABLET | Refills: 1 | Status: SHIPPED | OUTPATIENT
Start: 2018-08-21 | End: 2018-08-31 | Stop reason: SDUPTHER

## 2018-08-21 RX ORDER — METHYLPHENIDATE HYDROCHLORIDE 20 MG/1
20 TABLET ORAL 4 TIMES DAILY
Qty: 120 TABLET | Refills: 0 | Status: SHIPPED | OUTPATIENT
Start: 2018-08-21 | End: 2018-08-23 | Stop reason: SDUPTHER

## 2018-08-21 RX ORDER — ALPRAZOLAM 2 MG/1
TABLET ORAL
Qty: 150 TABLET | Refills: 2 | Status: SHIPPED | OUTPATIENT
Start: 2018-08-21 | End: 2018-10-25 | Stop reason: SDUPTHER

## 2018-08-23 DIAGNOSIS — F33.41 RECURRENT MAJOR DEPRESSIVE DISORDER, IN PARTIAL REMISSION (HCC): ICD-10-CM

## 2018-08-23 RX ORDER — METHYLPHENIDATE HYDROCHLORIDE 20 MG/1
20 TABLET ORAL 4 TIMES DAILY
Qty: 120 TABLET | Refills: 0 | Status: SHIPPED | OUTPATIENT
Start: 2018-08-23 | End: 2018-09-19 | Stop reason: SDUPTHER

## 2018-08-31 DIAGNOSIS — F33.41 RECURRENT MAJOR DEPRESSIVE DISORDER, IN PARTIAL REMISSION (HCC): ICD-10-CM

## 2018-08-31 RX ORDER — CITALOPRAM 10 MG/1
TABLET ORAL
Qty: 30 TABLET | Refills: 5 | Status: SHIPPED | OUTPATIENT
Start: 2018-08-31 | End: 2018-10-25 | Stop reason: SDUPTHER

## 2018-09-19 ENCOUNTER — TELEPHONE (OUTPATIENT)
Dept: PSYCHIATRY | Facility: CLINIC | Age: 58
End: 2018-09-19

## 2018-09-19 DIAGNOSIS — F33.41 RECURRENT MAJOR DEPRESSIVE DISORDER, IN PARTIAL REMISSION (HCC): ICD-10-CM

## 2018-09-19 RX ORDER — METHYLPHENIDATE HYDROCHLORIDE 20 MG/1
20 TABLET ORAL 4 TIMES DAILY
Qty: 120 TABLET | Refills: 0 | Status: SHIPPED | OUTPATIENT
Start: 2018-09-19 | End: 2018-10-22 | Stop reason: SDUPTHER

## 2018-09-19 RX ORDER — METHYLPHENIDATE HYDROCHLORIDE 20 MG/1
20 TABLET ORAL 4 TIMES DAILY
Qty: 120 TABLET | Refills: 0 | Status: SHIPPED | OUTPATIENT
Start: 2018-09-19 | End: 2018-09-19 | Stop reason: SDUPTHER

## 2018-10-22 ENCOUNTER — TELEPHONE (OUTPATIENT)
Dept: PSYCHIATRY | Facility: CLINIC | Age: 58
End: 2018-10-22

## 2018-10-22 DIAGNOSIS — F33.41 RECURRENT MAJOR DEPRESSIVE DISORDER, IN PARTIAL REMISSION (HCC): ICD-10-CM

## 2018-10-22 RX ORDER — METHYLPHENIDATE HYDROCHLORIDE 20 MG/1
20 TABLET ORAL 4 TIMES DAILY
Qty: 120 TABLET | Refills: 0 | Status: SHIPPED | OUTPATIENT
Start: 2018-10-22 | End: 2018-10-25 | Stop reason: SDUPTHER

## 2018-10-25 ENCOUNTER — OFFICE VISIT (OUTPATIENT)
Dept: PSYCHIATRY | Facility: CLINIC | Age: 58
End: 2018-10-25

## 2018-10-25 DIAGNOSIS — F33.41 RECURRENT MAJOR DEPRESSIVE DISORDER, IN PARTIAL REMISSION (HCC): ICD-10-CM

## 2018-10-25 DIAGNOSIS — F32.9 MAJOR DEPRESSION, CHRONIC: Primary | ICD-10-CM

## 2018-10-25 PROCEDURE — 99213 OFFICE O/P EST LOW 20 MIN: CPT | Performed by: NURSE PRACTITIONER

## 2018-10-25 RX ORDER — CITALOPRAM 10 MG/1
10 TABLET ORAL DAILY
Qty: 30 TABLET | Refills: 2 | Status: SHIPPED | OUTPATIENT
Start: 2018-10-25 | End: 2019-02-04 | Stop reason: SDUPTHER

## 2018-10-25 RX ORDER — METHYLPHENIDATE HYDROCHLORIDE 20 MG/1
20 TABLET ORAL 4 TIMES DAILY
Qty: 120 TABLET | Refills: 0 | Status: SHIPPED | OUTPATIENT
Start: 2018-10-25 | End: 2019-01-23 | Stop reason: SDUPTHER

## 2018-10-25 RX ORDER — ALPRAZOLAM 2 MG/1
TABLET ORAL
Qty: 150 TABLET | Refills: 2 | Status: SHIPPED | OUTPATIENT
Start: 2018-10-25 | End: 2018-10-31 | Stop reason: SDUPTHER

## 2018-10-25 RX ORDER — METHYLPHENIDATE HYDROCHLORIDE 20 MG/1
20 TABLET ORAL 4 TIMES DAILY
Qty: 120 TABLET | Refills: 0 | Status: SHIPPED | OUTPATIENT
Start: 2018-10-25 | End: 2018-10-25 | Stop reason: SDUPTHER

## 2018-10-25 NOTE — PSYCH
PROGRESS NOTE        [facility] [facility]      Name and Date of Birth:  Bert Polk 60 y o  1960    Date of Visit: 10/25/18    SUBJECTIVE:    Manasa Gonzales continues to feel better since the last visit  Mood is good  Patient takes all meds as prescribed  There is no history or evidence of any over use or abuse of meds  She is working 28 hr a week, she has focus she has energy interest and motivation  She has an upcoming vacation which he is looking forward to  All is well and she will follow-up with us in 3 months  She will continue with Ritalin 80 mg daily, citalopram 10 mg daily and Xanax 2 mg q i d   Of note, this was a patient of Dr Paul Alexander and she has been on these doses of medications for many years  No side effects or problems of ever been noted  She denies suicidal ideation, intent or plan at present, has no suicidal ideation, intent or plan at present  She denies any auditory hallucinations and visual hallucinations, denies any other delusional thinking, denies any delusional thinking  She denies any side effects from medications    HPI ROS Appetite Changes and Sleep: normal appetite, normal sleep    Review Of Systems:      Constitutional Negative   ENT Negative   Cardiovascular Negative   Respiratory As Noted in HPI   Gastrointestinal Negative   Genitourinary Negative   Musculoskeletal Negative   Integumentary Negative   Neurological Negative   Endocrine Negative   Other Symptoms Negative and None       Laboratory Results: No results found for this or any previous visit      Substance Abuse History:    History   Drug Use    Types: Amphetamines, Benzodiazepines     Comment: Prescribed by provider       Family Psychiatric History:     Family History   Problem Relation Age of Onset    Depression Mother     Anxiety disorder Mother     Depression Father     Anxiety disorder Father        The following portions of the patient's history were reviewed and updated as appropriate: past family history, past medical history, past social history, past surgical history and problem list     Social History     Social History    Marital status: /Civil Union     Spouse name: N/A    Number of children: N/A    Years of education: N/A     Occupational History    Not on file       Social History Main Topics    Smoking status: Former Smoker     Quit date: 2/23/2011    Smokeless tobacco: Never Used    Alcohol use Yes      Comment: Rare use    Drug use: Yes     Types: Amphetamines, Benzodiazepines      Comment: Prescribed by provider    Sexual activity: Not on file     Other Topics Concern    Not on file     Social History Narrative    No narrative on file     Social History     Social History Narrative    No narrative on file        Social History     Tobacco History     Smoking Status  Former Smoker Quit date  2/23/2011    Smokeless Tobacco Use  Never Used          Alcohol History     Alcohol Use Status  Yes Comment  Rare use          Drug Use     Drug Use Status  Yes Types  Amphetamines, Benzodiazepines Comment  Prescribed by provider          Sexual Activity     Sexually Active  Not Asked          Activities of Daily Living    Not Asked                     OBJECTIVE:     Mental Status Evaluation:    Appearance age appropriate, casually dressed   Behavior pleasant, cooperative   Speech normal volume, normal pitch   Mood improved   Affect brighter   Thought Processes logical   Associations intact associations   Thought Content normal   Perceptual Disturbances: none   Abnormal Thoughts  Risk Potential Suicidal ideation - None  Homicidal ideation - None  Potential for aggression - Yes   Orientation oriented to person, place, time/date and situation   Memory recent and remote memory grossly intact   Cosciousness alert and awake   Attention Span attention span and concentration are age appropriate   Intellect Appears to be of Average Intelligence Insight age appropriate and improved   Judgement good and improved   Muscle Strength and  Gait muscle strength and tone were normal   Language no difficulty naming common objects   Fund of Knowledge displays adequate knowledge of current events   Pain none   Pain Scale The Zyprexa want her to       Assessment/Plan:       Diagnoses and all orders for this visit:    Major depression, chronic    Recurrent major depressive disorder, in partial remission (HCC)  -     ALPRAZolam (XANAX) 2 MG tablet; 1 QID and 1 Extra PRN for breakthrough anxiety  -     citalopram (CeleXA) 10 mg tablet; Take 1 tablet (10 mg total) by mouth daily  -     methylphenidate (RITALIN) 20 MG tablet; Take 1 tablet (20 mg total) by mouth 4 (four) times a day Max Daily Amount: 80 mg          Treatment Recommendations/Precautions:    Continue current medications:    Risks/Benefits      Risks, Benefits And Possible Side Effects Of Medications:    Risks, benefits, and possible side effects of medications explained to patient and patient verbalizes understanding and agreement for treatment      Controlled Medication Discussion:     Not applicable    Psychotherapy Provided:     Individual psychotherapy provided: No

## 2018-10-31 ENCOUNTER — TELEPHONE (OUTPATIENT)
Dept: PSYCHIATRY | Facility: CLINIC | Age: 58
End: 2018-10-31

## 2018-10-31 DIAGNOSIS — F33.41 RECURRENT MAJOR DEPRESSIVE DISORDER, IN PARTIAL REMISSION (HCC): ICD-10-CM

## 2018-10-31 RX ORDER — ALPRAZOLAM 2 MG/1
TABLET ORAL
Qty: 150 TABLET | Refills: 2 | Status: SHIPPED | OUTPATIENT
Start: 2018-10-31 | End: 2019-02-04 | Stop reason: SDUPTHER

## 2018-10-31 NOTE — TELEPHONE ENCOUNTER
Pt called and stated she needs ayleenlam resent to [employer] on [facility]

## 2019-01-23 ENCOUNTER — TELEPHONE (OUTPATIENT)
Dept: PSYCHIATRY | Facility: CLINIC | Age: 59
End: 2019-01-23

## 2019-01-23 DIAGNOSIS — F33.41 RECURRENT MAJOR DEPRESSIVE DISORDER, IN PARTIAL REMISSION (HCC): ICD-10-CM

## 2019-01-23 RX ORDER — METHYLPHENIDATE HYDROCHLORIDE 20 MG/1
20 TABLET ORAL 4 TIMES DAILY
Qty: 120 TABLET | Refills: 0 | Status: SHIPPED | OUTPATIENT
Start: 2019-01-23 | End: 2019-02-04 | Stop reason: SDUPTHER

## 2019-02-04 ENCOUNTER — OFFICE VISIT (OUTPATIENT)
Dept: PSYCHIATRY | Facility: CLINIC | Age: 59
End: 2019-02-04

## 2019-02-04 DIAGNOSIS — F33.41 RECURRENT MAJOR DEPRESSIVE DISORDER, IN PARTIAL REMISSION (HCC): Primary | ICD-10-CM

## 2019-02-04 PROBLEM — F41.9 ANXIETY: Status: ACTIVE | Noted: 2019-02-04

## 2019-02-04 PROCEDURE — 99213 OFFICE O/P EST LOW 20 MIN: CPT | Performed by: NURSE PRACTITIONER

## 2019-02-04 RX ORDER — METHYLPHENIDATE HYDROCHLORIDE 20 MG/1
20 TABLET ORAL 4 TIMES DAILY
Qty: 120 TABLET | Refills: 0 | Status: SHIPPED | OUTPATIENT
Start: 2019-02-04 | End: 2019-02-04 | Stop reason: SDUPTHER

## 2019-02-04 RX ORDER — ALPRAZOLAM 2 MG/1
TABLET ORAL
Qty: 150 TABLET | Refills: 2 | Status: SHIPPED | OUTPATIENT
Start: 2019-02-04 | End: 2019-05-15 | Stop reason: SDUPTHER

## 2019-02-04 RX ORDER — METHYLPHENIDATE HYDROCHLORIDE 20 MG/1
20 TABLET ORAL 4 TIMES DAILY
Qty: 120 TABLET | Refills: 0 | Status: SHIPPED | OUTPATIENT
Start: 2019-02-04 | End: 2019-05-06 | Stop reason: SDUPTHER

## 2019-02-04 RX ORDER — CITALOPRAM 10 MG/1
TABLET ORAL
Qty: 30 TABLET | Refills: 5 | Status: SHIPPED | OUTPATIENT
Start: 2019-02-04 | End: 2020-02-03 | Stop reason: SDUPTHER

## 2019-02-04 NOTE — PSYCH
PROGRESS NOTE        Jose Marlow      Name and Date of Birth:  Jesus Marie 60 y o  1960    Date of Visit: 02/04/19    SUBJECTIVE:    Candida Anger continues to feel better since the last visit  Patient's mood is good and she is not suffering from any overt anxiety or depression  Work is going well as well as family and she appreciates the medications which are helping her on a daily basis  We will continue current meds and treatment and will follow up in 3 months or sooner if necessary  She denies suicidal ideation, intent or plan at present, has no suicidal ideation, intent or plan at present  She denies any auditory hallucinations and visual hallucinations, denies any other delusional thinking, denies any delusional thinking  She denies any side effects from medications    HPI ROS Appetite Changes and Sleep: normal appetite, normal sleep    Review Of Systems:      Constitutional Negative   ENT Negative   Cardiovascular Negative   Respiratory As Noted in HPI   Gastrointestinal Negative   Genitourinary Negative   Musculoskeletal Negative   Integumentary Negative   Neurological Negative   Endocrine Negative   Other Symptoms Negative and None       Laboratory Results: No results found for this or any previous visit      Substance Abuse History:    History   Drug Use    Types: Amphetamines, Benzodiazepines     Comment: Prescribed by provider       Family Psychiatric History:     Family History   Problem Relation Age of Onset    Depression Mother     Anxiety disorder Mother     Depression Father     Anxiety disorder Father        The following portions of the patient's history were reviewed and updated as appropriate: past family history, past medical history, past social history, past surgical history and problem list     Social History     Social History    Marital status: /Civil Union     Spouse name: N/A    Number of children: N/A    Years of education: N/A     Occupational History    Not on file       Social History Main Topics    Smoking status: Former Smoker     Quit date: 2/23/2011    Smokeless tobacco: Never Used    Alcohol use Yes      Comment: Rare use    Drug use: Yes     Types: Amphetamines, Benzodiazepines      Comment: Prescribed by provider    Sexual activity: Not on file     Other Topics Concern    Not on file     Social History Narrative    No narrative on file     Social History     Social History Narrative    No narrative on file        Social History     Tobacco History     Smoking Status  Former Smoker Quit date  2/23/2011    Smokeless Tobacco Use  Never Used          Alcohol History     Alcohol Use Status  Yes Comment  Rare use          Drug Use     Drug Use Status  Yes Types  Amphetamines, Benzodiazepines Comment  Prescribed by provider          Sexual Activity     Sexually Active  Not Asked          Activities of Daily Living    Not Asked                     OBJECTIVE:     Mental Status Evaluation:    Appearance age appropriate, casually dressed   Behavior pleasant, cooperative   Speech normal volume, normal pitch   Mood Stable   Affect bright   Thought Processes logical   Associations intact associations   Thought Content normal   Perceptual Disturbances: none   Abnormal Thoughts  Risk Potential Suicidal ideation - None  Homicidal ideation - None  Potential for aggression - No   Orientation oriented to person, place, time/date and situation   Memory recent and remote memory grossly intact   Cosciousness alert and awake   Attention Span attention span and concentration are age appropriate   Intellect Appears to be of Average Intelligence   Insight age appropriate and improved   Judgement good and improved   Muscle Strength and  Gait muscle strength and tone were normal   Language no difficulty naming common objects   Fund of Knowledge displays adequate knowledge of current events   Pain none   Pain Scale 0 Assessment/Plan:       Diagnoses and all orders for this visit:    Recurrent major depressive disorder, in partial remission ([facility] [address] )  -     Discontinue: methylphenidate (RITALIN) 20 MG tablet; Take 1 tablet (20 mg total) by mouth 4 (four) times a day Max Daily Amount: 80 mg  -     ALPRAZolam (XANAX) 2 MG tablet; 1 QID and 1 Extra PRN for breakthrough anxiety  -     citalopram (CeleXA) 10 mg tablet; 1/2-1 tab adelfo  -     Discontinue: methylphenidate (RITALIN) 20 MG tablet; Take 1 tablet (20 mg total) by mouth 4 (four) times a day Max Daily Amount: 80 mg  -     methylphenidate (RITALIN) 20 MG tablet; Take 1 tablet (20 mg total) by mouth 4 (four) times a day Max Daily Amount: 80 mg          Treatment Recommendations/Precautions:    Continue current medications:  Xanax 1 mg q i d  And 1 extra daily for breakthrough symptoms of anxiety  Celexa to 10-20 mg daily  Ritalin 20 mg q i d  Follow-up in 3 months or sooner if necessary  Risks/Benefits      Risks, Benefits And Possible Side Effects Of Medications:    Risks, benefits, and possible side effects of medications explained to patient and patient verbalizes understanding and agreement for treatment      Controlled Medication Discussion:     Not applicable    Psychotherapy Provided:     Individual psychotherapy provided: No

## 2019-05-06 ENCOUNTER — TELEPHONE (OUTPATIENT)
Dept: PSYCHIATRY | Facility: CLINIC | Age: 59
End: 2019-05-06

## 2019-05-06 DIAGNOSIS — F33.41 RECURRENT MAJOR DEPRESSIVE DISORDER, IN PARTIAL REMISSION (HCC): ICD-10-CM

## 2019-05-06 RX ORDER — METHYLPHENIDATE HYDROCHLORIDE 20 MG/1
20 TABLET ORAL 4 TIMES DAILY
Qty: 120 TABLET | Refills: 0 | Status: SHIPPED | OUTPATIENT
Start: 2019-05-06 | End: 2019-05-06 | Stop reason: SDUPTHER

## 2019-05-06 RX ORDER — METHYLPHENIDATE HYDROCHLORIDE 20 MG/1
20 TABLET ORAL 4 TIMES DAILY
Qty: 120 TABLET | Refills: 0 | Status: SHIPPED | OUTPATIENT
Start: 2019-05-06 | End: 2019-05-15 | Stop reason: SDUPTHER

## 2019-05-15 ENCOUNTER — OFFICE VISIT (OUTPATIENT)
Dept: PSYCHIATRY | Facility: CLINIC | Age: 59
End: 2019-05-15

## 2019-05-15 DIAGNOSIS — F33.41 RECURRENT MAJOR DEPRESSIVE DISORDER, IN PARTIAL REMISSION (HCC): ICD-10-CM

## 2019-05-15 PROCEDURE — 99213 OFFICE O/P EST LOW 20 MIN: CPT | Performed by: NURSE PRACTITIONER

## 2019-05-15 RX ORDER — METHYLPHENIDATE HYDROCHLORIDE 20 MG/1
20 TABLET ORAL 4 TIMES DAILY
Qty: 120 TABLET | Refills: 0 | Status: SHIPPED | OUTPATIENT
Start: 2019-05-15 | End: 2019-05-15 | Stop reason: SDUPTHER

## 2019-05-15 RX ORDER — ALPRAZOLAM 2 MG/1
TABLET ORAL
Qty: 150 TABLET | Refills: 2 | Status: SHIPPED | OUTPATIENT
Start: 2019-05-15 | End: 2019-07-31 | Stop reason: SDUPTHER

## 2019-05-15 RX ORDER — METHYLPHENIDATE HYDROCHLORIDE 20 MG/1
20 TABLET ORAL 4 TIMES DAILY
Qty: 120 TABLET | Refills: 0 | Status: SHIPPED | OUTPATIENT
Start: 2019-05-15 | End: 2019-07-31 | Stop reason: SDUPTHER

## 2019-07-31 ENCOUNTER — OFFICE VISIT (OUTPATIENT)
Dept: PSYCHIATRY | Facility: CLINIC | Age: 59
End: 2019-07-31
Payer: COMMERCIAL

## 2019-07-31 DIAGNOSIS — F33.41 RECURRENT MAJOR DEPRESSIVE DISORDER, IN PARTIAL REMISSION (HCC): ICD-10-CM

## 2019-07-31 PROCEDURE — 99213 OFFICE O/P EST LOW 20 MIN: CPT | Performed by: NURSE PRACTITIONER

## 2019-07-31 RX ORDER — METHYLPHENIDATE HYDROCHLORIDE 20 MG/1
20 TABLET ORAL 4 TIMES DAILY
Qty: 120 TABLET | Refills: 0 | Status: SHIPPED | OUTPATIENT
Start: 2019-07-31 | End: 2019-10-23 | Stop reason: SDUPTHER

## 2019-07-31 RX ORDER — ALPRAZOLAM 2 MG/1
TABLET ORAL
Qty: 150 TABLET | Refills: 2 | Status: SHIPPED | OUTPATIENT
Start: 2019-07-31 | End: 2019-10-23 | Stop reason: SDUPTHER

## 2019-07-31 NOTE — PSYCH
PROGRESS NOTE        [address] [facility]      Name and Date of Birth:  Beth Carpenter 60 y o  1960    Date of Visit: 07/31/19    SUBJECTIVE:    Jamia Cedillo presents for treatment of major depressive disorder, generalized anxiety disorder and attention deficit disorder  She is doing wonderfully  She tells me her medications are helping to keep her depression, her anxiety under control  She is less distracted at work more on task and is receiving kudos from her supervisors  Overall, she is doing very well we will continue her current meds and treatment and have her follow-up with us in October  She denies suicidal ideation, intent or plan at present, has no suicidal ideation, intent or plan at present  She denies any auditory hallucinations and visual hallucinations, denies any other delusional thinking, denies any delusional thinking  She denies any side effects from medications    HPI ROS Appetite Changes and Sleep: normal appetite, normal sleep    Review Of Systems:      Constitutional Negative   ENT Negative   Cardiovascular Negative   Respiratory As Noted in HPI   Gastrointestinal Negative   Genitourinary Negative   Musculoskeletal Negative   Integumentary Negative   Neurological Negative   Endocrine Negative   Other Symptoms Negative and None       Laboratory Results: No results found for this or any previous visit      Substance Abuse History:    Social History     Substance and Sexual Activity   Drug Use Yes    Types: Amphetamines, Benzodiazepines    Comment: Prescribed by provider       Family Psychiatric History:     Family History   Problem Relation Age of Onset    Depression Mother     Anxiety disorder Mother     Depression Father     Anxiety disorder Father        The following portions of the patient's history were reviewed and updated as appropriate: past family history, past medical history, past social history, past surgical history and problem list     Social History     Socioeconomic History    Marital status: /Civil Union     Spouse name: Not on file    Number of children: Not on file    Years of education: Not on file    Highest education level: Not on file   Occupational History    Not on file   Social Needs    Financial resource strain: Not on file    Food insecurity:     Worry: Not on file     Inability: Not on file    Transportation needs:     Medical: Not on file     Non-medical: Not on file   Tobacco Use    Smoking status: Former Smoker     Last attempt to quit: 2011     Years since quittin 4    Smokeless tobacco: Never Used   Substance and Sexual Activity    Alcohol use: Yes     Comment: Rare use    Drug use: Yes     Types: Amphetamines, Benzodiazepines     Comment: Prescribed by provider    Sexual activity: Not on file   Lifestyle    Physical activity:     Days per week: Not on file     Minutes per session: Not on file    Stress: Not on file   Relationships    Social connections:     Talks on phone: Not on file     Gets together: Not on file     Attends Gnosticist service: Not on file     Active member of club or organization: Not on file     Attends meetings of clubs or organizations: Not on file     Relationship status: Not on file    Intimate partner violence:     Fear of current or ex partner: Not on file     Emotionally abused: Not on file     Physically abused: Not on file     Forced sexual activity: Not on file   Other Topics Concern    Not on file   Social History Narrative    Not on file     Social History     Social History Narrative    Not on file        Social History     Tobacco History     Smoking Status  Former Smoker Quit date  2011    Smokeless Tobacco Use  Never Used          Alcohol History     Alcohol Use Status  Yes Comment  Rare use          Drug Use     Drug Use Status  Yes Types  Amphetamines, Benzodiazepines Comment  Prescribed by provider          Sexual Activity Sexually Active  Not Asked          Activities of Daily Living    Not Asked                     OBJECTIVE:     Mental Status Evaluation:    Appearance age appropriate, casually dressed   Behavior pleasant, cooperative   Speech normal volume, normal pitch   Mood improved   Affect brighter   Thought Processes logical   Associations intact associations   Thought Content normal   Perceptual Disturbances: none   Abnormal Thoughts  Risk Potential Suicidal ideation - None  Homicidal ideation - None  Potential for aggression - No   Orientation oriented to person, place, time/date and situation   Memory recent and remote memory grossly intact   Cosciousness alert and awake   Attention Span attention span and concentration are age appropriate   Intellect Appears to be of Average Intelligence   Insight age appropriate and improved   Judgement good and improved   Muscle Strength and  Gait muscle strength and tone were normal   Language no difficulty naming common objects   Fund of Knowledge displays adequate knowledge of current events   Pain None   Pain Scale 0       Assessment/Plan:       Diagnoses and all orders for this visit:    Recurrent major depressive disorder, in partial remission (HCC)  -     methylphenidate (RITALIN) 20 MG tablet; Take 1 tablet (20 mg total) by mouth 4 (four) times a dayMax Daily Amount: 80 mg  -     methylphenidate (RITALIN) 20 MG tablet; Take 1 tablet (20 mg total) by mouth 4 (four) times a dayMax Daily Amount: 80 mg  -     ALPRAZolam (XANAX) 2 MG tablet; 1 QID and 1 Extra PRN for breakthrough anxiety          Treatment Recommendations/Precautions:    Continue current medications:  Ritalin 20 mg q i d  Xanax 2 mg q i d  Celexa 10 mg daily  Follow-up in October or sooner if necessary      Risks/Benefits      Risks, Benefits And Possible Side Effects Of Medications:    Risks, benefits, and possible side effects of medications explained to patient and patient verbalizes understanding and agreement for treatment      Controlled Medication Discussion:     Not applicable    Psychotherapy Provided:     Individual psychotherapy provided: No

## 2019-10-23 ENCOUNTER — OFFICE VISIT (OUTPATIENT)
Dept: PSYCHIATRY | Facility: CLINIC | Age: 59
End: 2019-10-23

## 2019-10-23 DIAGNOSIS — F33.0 MILD EPISODE OF RECURRENT MAJOR DEPRESSIVE DISORDER (HCC): Primary | ICD-10-CM

## 2019-10-23 DIAGNOSIS — F33.41 RECURRENT MAJOR DEPRESSIVE DISORDER, IN PARTIAL REMISSION (HCC): ICD-10-CM

## 2019-10-23 DIAGNOSIS — F41.1 GENERALIZED ANXIETY DISORDER: ICD-10-CM

## 2019-10-23 DIAGNOSIS — F90.0 ATTENTION DEFICIT HYPERACTIVITY DISORDER (ADHD), PREDOMINANTLY INATTENTIVE TYPE: ICD-10-CM

## 2019-10-23 PROCEDURE — 99213 OFFICE O/P EST LOW 20 MIN: CPT | Performed by: NURSE PRACTITIONER

## 2019-10-23 RX ORDER — METHYLPHENIDATE HYDROCHLORIDE 20 MG/1
20 TABLET ORAL 2 TIMES DAILY
Qty: 120 TABLET | Refills: 0 | Status: SHIPPED | OUTPATIENT
Start: 2019-10-23 | End: 2019-12-30 | Stop reason: CLARIF

## 2019-10-23 RX ORDER — ALPRAZOLAM 2 MG/1
TABLET ORAL
Qty: 150 TABLET | Refills: 2 | Status: SHIPPED | OUTPATIENT
Start: 2019-10-23 | End: 2020-02-12 | Stop reason: SDUPTHER

## 2019-10-23 RX ORDER — METHYLPHENIDATE HYDROCHLORIDE 20 MG/1
20 TABLET ORAL 4 TIMES DAILY
Qty: 120 TABLET | Refills: 0 | Status: SHIPPED | OUTPATIENT
Start: 2019-10-23 | End: 2020-02-12 | Stop reason: DRUGHIGH

## 2019-10-23 NOTE — PSYCH
PROGRESS NOTE        [address] [facility]      Name and Date of Birth:  Panchito Medina 60 y o  1960    Date of Visit: 10/23/19    SUBJECTIVE:    Christopher Brian presents today for treatment of major depressive disorder, generalized anxiety disorder and attention deficit disorder on examination today, she is doing very well  She has energy interest and motivation and she is very content in her life  All is going well with her with no deficits  She has no side effects to the meds  She has focus, she has concentration and energy and interest at her job  No new clinical issues or concerns were voiced by the patient  We will follow up with her again in February of 2020  She denies suicidal ideation, intent or plan at present, has no suicidal ideation, intent or plan at present  She denies any auditory hallucinations and visual hallucinations, denies any other delusional thinking, denies any delusional thinking  She denies any side effects from medications    HPI ROS Appetite Changes and Sleep: normal appetite, normal sleep    Review Of Systems:      Constitutional Negative   ENT Negative   Cardiovascular Negative   Respiratory As Noted in HPI   Gastrointestinal Negative   Genitourinary Negative   Musculoskeletal Negative   Integumentary Negative   Neurological Negative   Endocrine Negative   Other Symptoms Negative and None       Laboratory Results: No results found for this or any previous visit      Substance Abuse History:    Social History     Substance and Sexual Activity   Drug Use Yes    Types: Amphetamines, Benzodiazepines    Comment: Prescribed by provider       Family Psychiatric History:     Family History   Problem Relation Age of Onset    Depression Mother     Anxiety disorder Mother     Depression Father     Anxiety disorder Father        The following portions of the patient's history were reviewed and updated as appropriate: past family history, past medical history, past social history, past surgical history and problem list     Social History     Socioeconomic History    Marital status: /Civil Union     Spouse name: Not on file    Number of children: Not on file    Years of education: Not on file    Highest education level: Not on file   Occupational History    Not on file   Social Needs    Financial resource strain: Not on file    Food insecurity:     Worry: Not on file     Inability: Not on file    Transportation needs:     Medical: Not on file     Non-medical: Not on file   Tobacco Use    Smoking status: Former Smoker     Last attempt to quit: 2011     Years since quittin 6    Smokeless tobacco: Never Used   Substance and Sexual Activity    Alcohol use: Yes     Comment: Rare use    Drug use: Yes     Types: Amphetamines, Benzodiazepines     Comment: Prescribed by provider    Sexual activity: Not on file   Lifestyle    Physical activity:     Days per week: Not on file     Minutes per session: Not on file    Stress: Not on file   Relationships    Social connections:     Talks on phone: Not on file     Gets together: Not on file     Attends Mormon service: Not on file     Active member of club or organization: Not on file     Attends meetings of clubs or organizations: Not on file     Relationship status: Not on file    Intimate partner violence:     Fear of current or ex partner: Not on file     Emotionally abused: Not on file     Physically abused: Not on file     Forced sexual activity: Not on file   Other Topics Concern    Not on file   Social History Narrative    Not on file     Social History     Social History Narrative    Not on file        Social History     Tobacco History     Smoking Status  Former Smoker Quit date  2011    Smokeless Tobacco Use  Never Used          Alcohol History     Alcohol Use Status  Yes Comment  Rare use          Drug Use     Drug Use Status  Yes Types  Amphetamines, Benzodiazepines Comment  Prescribed by provider          Sexual Activity     Sexually Active  Not Asked          Activities of Daily Living    Not Asked                     OBJECTIVE:     Mental Status Evaluation:    Appearance age appropriate, casually dressed   Behavior pleasant, cooperative   Speech normal volume, normal pitch   Mood Stable mood   Affect Bright   Thought Processes logical   Associations intact associations   Thought Content Normal   Perceptual Disturbances: None   Abnormal Thoughts  Risk Potential Suicidal ideation - None  Homicidal ideation - None  Potential for aggression - No   Orientation oriented to person, place, time/date and situation   Memory recent and remote memory grossly intact   Cosciousness alert and awake   Attention Span attention span and concentration are age appropriate   Intellect Appears to be of Average Intelligence   Insight Good   Judgement Good   Muscle Strength and  Gait muscle strength and tone were normal   Language no difficulty naming common objects   Fund of Knowledge displays adequate knowledge of current events   Pain None   Pain Scale 0       Assessment/Plan:       There are no diagnoses linked to this encounter  Treatment Recommendations/Precautions:    Continue current medications: The Ritalin 20 mg q i d  Celexa 15 mg daily  Xanax 2 mg q i d  Follow-up in February 2020  Risks/Benefits      Risks, Benefits And Possible Side Effects Of Medications:    Risks, benefits, and possible side effects of medications explained to patient and patient verbalizes understanding and agreement for treatment  Controlled Medication Discussion:  No history of any overuse or abuse of controlled substances      Not applicable    Psychotherapy Provided:     Individual psychotherapy provided: No

## 2019-12-30 ENCOUNTER — TELEPHONE (OUTPATIENT)
Dept: PSYCHIATRY | Facility: CLINIC | Age: 59
End: 2019-12-30

## 2019-12-30 DIAGNOSIS — F98.8 ATTENTION DEFICIT DISORDER, UNSPECIFIED HYPERACTIVITY PRESENCE: Primary | ICD-10-CM

## 2019-12-30 RX ORDER — METHYLPHENIDATE HYDROCHLORIDE 20 MG/1
20 TABLET ORAL 4 TIMES DAILY
Qty: 120 TABLET | Refills: 0 | Status: SHIPPED | OUTPATIENT
Start: 2019-12-30 | End: 2020-02-12 | Stop reason: DRUGHIGH

## 2019-12-30 NOTE — TELEPHONE ENCOUNTER
Vanita Minit is due for a refill of ritalin for January soon  But while reading her old bottles, she said it used to say 80mg  Now her refill says 40mg   She's unsure if you discussed a decrease with her at her last appointment in october

## 2020-02-03 DIAGNOSIS — F33.41 RECURRENT MAJOR DEPRESSIVE DISORDER, IN PARTIAL REMISSION (HCC): ICD-10-CM

## 2020-02-03 RX ORDER — CITALOPRAM 10 MG/1
TABLET ORAL
Qty: 30 TABLET | Refills: 5 | Status: SHIPPED | OUTPATIENT
Start: 2020-02-03 | End: 2020-02-12 | Stop reason: SDUPTHER

## 2020-02-12 ENCOUNTER — OFFICE VISIT (OUTPATIENT)
Dept: PSYCHIATRY | Facility: CLINIC | Age: 60
End: 2020-02-12

## 2020-02-12 DIAGNOSIS — F33.41 RECURRENT MAJOR DEPRESSIVE DISORDER, IN PARTIAL REMISSION (HCC): Primary | ICD-10-CM

## 2020-02-12 DIAGNOSIS — F90.0 ATTENTION DEFICIT HYPERACTIVITY DISORDER (ADHD), PREDOMINANTLY INATTENTIVE TYPE: ICD-10-CM

## 2020-02-12 RX ORDER — CITALOPRAM 10 MG/1
TABLET ORAL
Qty: 30 TABLET | Refills: 5 | Status: SHIPPED | OUTPATIENT
Start: 2020-02-12 | End: 2020-03-19 | Stop reason: SDUPTHER

## 2020-02-12 RX ORDER — ALPRAZOLAM 2 MG/1
TABLET ORAL
Qty: 150 TABLET | Refills: 2 | Status: SHIPPED | OUTPATIENT
Start: 2020-02-12 | End: 2020-05-27 | Stop reason: SDUPTHER

## 2020-02-12 RX ORDER — METHYLPHENIDATE HYDROCHLORIDE 20 MG/1
20 TABLET ORAL 4 TIMES DAILY
Qty: 120 TABLET | Refills: 0 | Status: SHIPPED | OUTPATIENT
Start: 2020-03-03 | End: 2020-03-19 | Stop reason: SDUPTHER

## 2020-02-12 NOTE — PSYCH
PROGRESS NOTE        [address] [facility]      Name and Date of Birth:  Berry Nixon 60 y o  1960    Date of Visit: 02/12/20    SUBJECTIVE:    Sundar Paul presents today for treatment of major depressive disorder and generalized anxiety disorder  On examination today, the patient's mood is good  He is having no breakthrough symptoms of anxiety or depression  Unfortunately, she does tell me that she is sad because her neighbor, age 64 just recently passed away  She is handling her grief well  Overall, she is not reporting any other issues so we will continue her current medication regimen and she will follow up with us again in 6 months or sooner if necessary  She denies suicidal ideation, intent or plan at present, has no suicidal ideation, intent or plan at present  She denies any auditory hallucinations and visual hallucinations, denies any other delusional thinking, denies any delusional thinking  She denies any side effects from medications    HPI ROS Appetite Changes and Sleep: normal appetite, normal sleep    Review Of Systems:      Constitutional Negative   ENT Negative   Cardiovascular Negative   Respiratory As Noted in HPI   Gastrointestinal Negative   Genitourinary Negative   Musculoskeletal Negative   Integumentary Negative   Neurological Negative   Endocrine Negative   Other Symptoms Negative and None       Laboratory Results: No results found for this or any previous visit      Substance Abuse History:    Social History     Substance and Sexual Activity   Drug Use Yes    Types: Amphetamines, Benzodiazepines    Comment: Prescribed by provider       Family Psychiatric History:     Family History   Problem Relation Age of Onset    Depression Mother     Anxiety disorder Mother     Depression Father     Anxiety disorder Father        The following portions of the patient's history were reviewed and updated as appropriate: past family history, past medical history, past social history, past surgical history and problem list     Social History     Socioeconomic History    Marital status: /Civil Union     Spouse name: Not on file    Number of children: Not on file    Years of education: Not on file    Highest education level: Not on file   Occupational History    Not on file   Social Needs    Financial resource strain: Not on file    Food insecurity:     Worry: Not on file     Inability: Not on file    Transportation needs:     Medical: Not on file     Non-medical: Not on file   Tobacco Use    Smoking status: Former Smoker     Last attempt to quit: 2011     Years since quittin 9    Smokeless tobacco: Never Used   Substance and Sexual Activity    Alcohol use: Yes     Comment: Rare use    Drug use: Yes     Types: Amphetamines, Benzodiazepines     Comment: Prescribed by provider    Sexual activity: Not on file   Lifestyle    Physical activity:     Days per week: Not on file     Minutes per session: Not on file    Stress: Not on file   Relationships    Social connections:     Talks on phone: Not on file     Gets together: Not on file     Attends Anabaptist service: Not on file     Active member of club or organization: Not on file     Attends meetings of clubs or organizations: Not on file     Relationship status: Not on file    Intimate partner violence:     Fear of current or ex partner: Not on file     Emotionally abused: Not on file     Physically abused: Not on file     Forced sexual activity: Not on file   Other Topics Concern    Not on file   Social History Narrative    Not on file     Social History     Social History Narrative    Not on file        Social History     Tobacco History     Smoking Status  Former Smoker Quit date  2011    Smokeless Tobacco Use  Never Used          Alcohol History     Alcohol Use Status  Yes Comment  Rare use          Drug Use     Drug Use Status  Yes Types  Amphetamines, Benzodiazepines Comment  Prescribed by provider          Sexual Activity     Sexually Active  Not Asked          Activities of Daily Living    Not Asked                     OBJECTIVE:     Mental Status Evaluation:    Appearance age appropriate, casually dressed   Behavior pleasant, cooperative   Speech normal volume, normal pitch   Mood Stable   Affect Bright   Thought Processes logical   Associations intact associations   Thought Content Normal   Perceptual Disturbances: None   Abnormal Thoughts  Risk Potential Suicidal ideation - None  Homicidal ideation - None  Potential for aggression - No   Orientation oriented to person, place, time/date and situation   Memory recent and remote memory grossly intact   Cosciousness alert and awake   Attention Span attention span and concentration are age appropriate   Intellect Appears to be of Average Intelligence   Insight Good   Judgement Good   Muscle Strength and  Gait muscle strength and tone were normal   Language no difficulty naming common objects   Fund of Knowledge displays adequate knowledge of current events   Pain None   Pain Scale 0       Assessment/Plan:       Diagnoses and all orders for this visit:    Recurrent major depressive disorder, in partial remission (HCC)  -     citalopram (CeleXA) 10 mg tablet; 1/2-1 tab adelfo  -     ALPRAZolam (XANAX) 2 MG tablet; 1 QID and 1 Extra PRN for breakthrough anxiety    Attention deficit hyperactivity disorder (ADHD), predominantly inattentive type  -     methylphenidate (RITALIN) 20 MG tablet; Take 1 tablet (20 mg total) by mouth 4 (four) times a dayMax Daily Amount: 80 mg          Treatment Recommendations/Precautions:    Continue current medications:  Ritalin 20 mg q i d  Celexa 5-10 mg daily  Xanax 2 mg q i d  Follow-up in 6 months or sooner if necessary      Risks/Benefits      Risks, Benefits And Possible Side Effects Of Medications:    Risks, benefits, and possible side effects of medications explained to patient and patient verbalizes understanding and agreement for treatment  Controlled Medication Discussion:  No history of any overuse or abuse of controlled substances      Not applicable    Psychotherapy Provided:     Individual psychotherapy provided: No

## 2020-03-17 ENCOUNTER — TELEPHONE (OUTPATIENT)
Dept: PSYCHIATRY | Facility: CLINIC | Age: 60
End: 2020-03-17

## 2020-03-17 NOTE — TELEPHONE ENCOUNTER
Hilda Ochoa called asked if you can please call her, she needs to talk to you regarding her anxiety her job closed for two weeks and her anxiety level is high        Contact# [phone]

## 2020-03-19 ENCOUNTER — OFFICE VISIT (OUTPATIENT)
Dept: PSYCHIATRY | Facility: CLINIC | Age: 60
End: 2020-03-19
Payer: COMMERCIAL

## 2020-03-19 DIAGNOSIS — F33.41 RECURRENT MAJOR DEPRESSIVE DISORDER, IN PARTIAL REMISSION (HCC): ICD-10-CM

## 2020-03-19 PROCEDURE — 99214 OFFICE O/P EST MOD 30 MIN: CPT | Performed by: NURSE PRACTITIONER

## 2020-03-19 RX ORDER — CITALOPRAM 20 MG/1
TABLET ORAL
Qty: 30 TABLET | Refills: 2 | Status: SHIPPED | OUTPATIENT
Start: 2020-03-19 | End: 2020-08-12 | Stop reason: SDUPTHER

## 2020-03-19 NOTE — PSYCH
PROGRESS NOTE        Jose Marlow      Name and Date of Birth:  Shi Jiang 60 y o  1960    Date of Visit: 03/19/20    SUBJECTIVE:    Anastasia Santiago presents today for treatment of major depressive disorder, mood disorder and generalized anxiety disorder  There is also history of attention deficit disorder  On examination today, she is telling me that she is overwhelmed, she is anxious and she is upset triggered [place]  She has recently been asked by her employer to work from home and this has her stress to the point where she cannot function well  She is having breakthrough symptoms of anxiety and depression and mood lability  Are increasing Celexa to 20 mg a day I am keeping her out of work for the next several weeks  She has difficulty functioning at home without structure at her workplace provide that for her  The home-based does not  I have completed her FMLA paperwork and she will follow-up with us in 4 weeks or sooner if necessary  She denies suicidal ideation, intent or plan at present, has no suicidal ideation, intent or plan at present  She denies any auditory hallucinations and visual hallucinations, denies any other delusional thinking, denies any delusional thinking  She denies any side effects from medications    HPI ROS Appetite Changes and Sleep: normal appetite, normal sleep    Review Of Systems:      Constitutional Negative   ENT Negative   Cardiovascular Negative   Respiratory As Noted in HPI   Gastrointestinal Negative   Genitourinary Negative   Musculoskeletal Negative   Integumentary Negative   Neurological Negative   Endocrine Negative   Other Symptoms Negative and None       Laboratory Results: No results found for this or any previous visit      Substance Abuse History:    Social History     Substance and Sexual Activity   Drug Use Yes    Types: Amphetamines, Benzodiazepines    Comment: Prescribed by provider       Family Psychiatric History:     Family History   Problem Relation Age of Onset    Depression Mother     Anxiety disorder Mother     Depression Father     Anxiety disorder Father        The following portions of the patient's history were reviewed and updated as appropriate: past family history, past medical history, past social history, past surgical history and problem list     Social History     Socioeconomic History    Marital status: /Civil Union     Spouse name: Not on file    Number of children: Not on file    Years of education: Not on file    Highest education level: Not on file   Occupational History    Not on file   Social Needs    Financial resource strain: Not on file    Food insecurity:     Worry: Not on file     Inability: Not on file    Transportation needs:     Medical: Not on file     Non-medical: Not on file   Tobacco Use    Smoking status: Former Smoker     Last attempt to quit: 2011     Years since quittin 0    Smokeless tobacco: Never Used   Substance and Sexual Activity    Alcohol use: Yes     Comment: Rare use    Drug use: Yes     Types: Amphetamines, Benzodiazepines     Comment: Prescribed by provider    Sexual activity: Not on file   Lifestyle    Physical activity:     Days per week: Not on file     Minutes per session: Not on file    Stress: Not on file   Relationships    Social connections:     Talks on phone: Not on file     Gets together: Not on file     Attends Sabianism service: Not on file     Active member of club or organization: Not on file     Attends meetings of clubs or organizations: Not on file     Relationship status: Not on file    Intimate partner violence:     Fear of current or ex partner: Not on file     Emotionally abused: Not on file     Physically abused: Not on file     Forced sexual activity: Not on file   Other Topics Concern    Not on file   Social History Narrative    Not on file     Social History     Social History Narrative    Not on file        Social History     Tobacco History     Smoking Status  Former Smoker Quit date  2/23/2011    Smokeless Tobacco Use  Never Used          Alcohol History     Alcohol Use Status  Yes Comment  Rare use          Drug Use     Drug Use Status  Yes Types  Amphetamines, Benzodiazepines Comment  Prescribed by provider          Sexual Activity     Sexually Active  Not Asked          Activities of Daily Living    Not Asked                     OBJECTIVE:     Mental Status Evaluation:    Appearance Disheveled   Behavior Anxious   Speech normal volume, normal pitch   Mood Anxious and labile   Affect Constricted   Thought Processes Ruminates and obsesses   Associations Loose   Thought Content Perseverates   Perceptual Disturbances: None   Abnormal Thoughts  Risk Potential Suicidal ideation - None  Homicidal ideation - None  Potential for aggression - No   Orientation oriented to person, place, time/date and situation   Memory recent and remote memory grossly intact   Cosciousness alert and awake   Attention Span Poor   Intellect Appears to be of Average Intelligence   Insight Fair   Judgement Fair   Muscle Strength and  Gait muscle strength and tone were normal   Language no difficulty naming common objects   Fund of Knowledge displays adequate knowledge of current events   Pain None   Pain Scale 0       Assessment/Plan:       Diagnoses and all orders for this visit:    Recurrent major depressive disorder, in partial remission (HCC)  -     citalopram (CeleXA) 20 mg tablet; 1 Daily          Treatment Recommendations/Precautions:    Continue current medications:  Increase Celexa 20 mg daily  Continue Ritalin 20 mg q i d  Continue Xanax 2 mg q i d  And may have 1 extra daily p r n  for excessive breakthrough symptoms of anxiety  We will keep this patient out of work for the next 2-4 weeks      Follow-up with us in 4 weeks    Risks/Benefits      Risks, Benefits And Possible Side Effects Of Medications:    Risks, benefits, and possible side effects of medications explained to patient and patient verbalizes understanding and agreement for treatment  Controlled Medication Discussion:  No history of any overuse or abuse of controlled substances      Not applicable    Psychotherapy Provided:     Individual psychotherapy provided: No

## 2020-04-06 ENCOUNTER — TELEPHONE (OUTPATIENT)
Dept: PSYCHIATRY | Facility: CLINIC | Age: 60
End: 2020-04-06

## 2020-04-06 DIAGNOSIS — F90.0 ATTENTION DEFICIT HYPERACTIVITY DISORDER (ADHD), PREDOMINANTLY INATTENTIVE TYPE: ICD-10-CM

## 2020-04-06 RX ORDER — METHYLPHENIDATE HYDROCHLORIDE 20 MG/1
20 TABLET ORAL 4 TIMES DAILY
Qty: 120 TABLET | Refills: 0 | Status: SHIPPED | OUTPATIENT
Start: 2020-04-06 | End: 2020-05-05 | Stop reason: SDUPTHER

## 2020-04-08 ENCOUNTER — TELEMEDICINE (OUTPATIENT)
Dept: PSYCHIATRY | Facility: CLINIC | Age: 60
End: 2020-04-08
Payer: COMMERCIAL

## 2020-04-08 DIAGNOSIS — F41.1 GENERALIZED ANXIETY DISORDER: ICD-10-CM

## 2020-04-08 DIAGNOSIS — F90.0 ATTENTION DEFICIT HYPERACTIVITY DISORDER (ADHD), PREDOMINANTLY INATTENTIVE TYPE: ICD-10-CM

## 2020-04-08 DIAGNOSIS — F33.1 MODERATE EPISODE OF RECURRENT MAJOR DEPRESSIVE DISORDER (HCC): Primary | ICD-10-CM

## 2020-04-08 PROCEDURE — 99214 OFFICE O/P EST MOD 30 MIN: CPT | Performed by: NURSE PRACTITIONER

## 2020-05-05 DIAGNOSIS — F90.0 ATTENTION DEFICIT HYPERACTIVITY DISORDER (ADHD), PREDOMINANTLY INATTENTIVE TYPE: ICD-10-CM

## 2020-05-05 RX ORDER — METHYLPHENIDATE HYDROCHLORIDE 20 MG/1
20 TABLET ORAL 4 TIMES DAILY
Qty: 120 TABLET | Refills: 0 | Status: SHIPPED | OUTPATIENT
Start: 2020-05-05 | End: 2020-06-09 | Stop reason: SDUPTHER

## 2020-05-27 DIAGNOSIS — F33.41 RECURRENT MAJOR DEPRESSIVE DISORDER, IN PARTIAL REMISSION (HCC): ICD-10-CM

## 2020-05-28 RX ORDER — ALPRAZOLAM 2 MG/1
TABLET ORAL
Qty: 150 TABLET | Refills: 2 | Status: SHIPPED | OUTPATIENT
Start: 2020-05-28 | End: 2020-08-12 | Stop reason: SDUPTHER

## 2020-06-09 ENCOUNTER — TELEMEDICINE (OUTPATIENT)
Dept: PSYCHIATRY | Facility: CLINIC | Age: 60
End: 2020-06-09

## 2020-06-09 DIAGNOSIS — F33.1 MODERATE EPISODE OF RECURRENT MAJOR DEPRESSIVE DISORDER (HCC): Primary | ICD-10-CM

## 2020-06-09 DIAGNOSIS — F90.0 ATTENTION DEFICIT HYPERACTIVITY DISORDER (ADHD), PREDOMINANTLY INATTENTIVE TYPE: ICD-10-CM

## 2020-06-09 PROCEDURE — 99214 OFFICE O/P EST MOD 30 MIN: CPT | Performed by: NURSE PRACTITIONER

## 2020-06-09 RX ORDER — METHYLPHENIDATE HYDROCHLORIDE 20 MG/1
20 TABLET ORAL 4 TIMES DAILY
Qty: 120 TABLET | Refills: 0 | Status: SHIPPED | OUTPATIENT
Start: 2020-06-09 | End: 2020-08-12

## 2020-06-09 RX ORDER — METHYLPHENIDATE HYDROCHLORIDE 20 MG/1
20 TABLET ORAL 4 TIMES DAILY
Qty: 120 TABLET | Refills: 0 | Status: SHIPPED | OUTPATIENT
Start: 2020-07-07 | End: 2020-07-08 | Stop reason: SDUPTHER

## 2020-07-08 DIAGNOSIS — F90.0 ATTENTION DEFICIT HYPERACTIVITY DISORDER (ADHD), PREDOMINANTLY INATTENTIVE TYPE: ICD-10-CM

## 2020-07-08 RX ORDER — METHYLPHENIDATE HYDROCHLORIDE 20 MG/1
20 TABLET ORAL 4 TIMES DAILY
Qty: 120 TABLET | Refills: 0 | Status: SHIPPED | OUTPATIENT
Start: 2020-07-08 | End: 2020-08-05 | Stop reason: SDUPTHER

## 2020-08-05 DIAGNOSIS — F90.0 ATTENTION DEFICIT HYPERACTIVITY DISORDER (ADHD), PREDOMINANTLY INATTENTIVE TYPE: ICD-10-CM

## 2020-08-05 RX ORDER — METHYLPHENIDATE HYDROCHLORIDE 20 MG/1
20 TABLET ORAL 4 TIMES DAILY
Qty: 120 TABLET | Refills: 0 | Status: SHIPPED | OUTPATIENT
Start: 2020-08-05 | End: 2020-11-05 | Stop reason: SDUPTHER

## 2020-08-12 ENCOUNTER — OFFICE VISIT (OUTPATIENT)
Dept: PSYCHIATRY | Facility: CLINIC | Age: 60
End: 2020-08-12

## 2020-08-12 DIAGNOSIS — F90.0 ATTENTION DEFICIT HYPERACTIVITY DISORDER (ADHD), PREDOMINANTLY INATTENTIVE TYPE: ICD-10-CM

## 2020-08-12 DIAGNOSIS — F33.41 RECURRENT MAJOR DEPRESSIVE DISORDER, IN PARTIAL REMISSION (HCC): ICD-10-CM

## 2020-08-12 DIAGNOSIS — F41.1 GENERALIZED ANXIETY DISORDER: Primary | ICD-10-CM

## 2020-08-12 PROCEDURE — 99214 OFFICE O/P EST MOD 30 MIN: CPT | Performed by: NURSE PRACTITIONER

## 2020-08-12 RX ORDER — METHYLPHENIDATE HYDROCHLORIDE 20 MG/1
20 TABLET ORAL 4 TIMES DAILY
Qty: 120 TABLET | Refills: 0 | Status: SHIPPED | OUTPATIENT
Start: 2020-08-12 | End: 2020-12-30 | Stop reason: SDUPTHER

## 2020-08-12 RX ORDER — CITALOPRAM 20 MG/1
TABLET ORAL
Qty: 90 TABLET | Refills: 2 | Status: SHIPPED | OUTPATIENT
Start: 2020-08-12 | End: 2022-02-07 | Stop reason: ALTCHOICE

## 2020-08-12 RX ORDER — ALPRAZOLAM 2 MG/1
TABLET ORAL
Qty: 150 TABLET | Refills: 2 | Status: SHIPPED | OUTPATIENT
Start: 2020-08-12 | End: 2020-12-21 | Stop reason: SDUPTHER

## 2020-08-12 NOTE — PSYCH
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  She continues to reap the benefits of the Ritalin helping her stay on task, stay focused and she is doing well with her online classes  There are no new clinical issues or concerns voiced by the patient  She has and continues to do well on her current medication regimen so we will continue meds as is  Follow-up with me will be in 3 months  She denies suicidal ideation, intent or plan at present, has no suicidal ideation, intent or plan at present  She denies any auditory hallucinations and visual hallucinations, denies any other delusional thinking, denies any delusional thinking  She denies any side effects from medications    HPI ROS Appetite Changes and Sleep: normal appetite, normal sleep    Review Of Systems:      Constitutional Negative   ENT Negative   Cardiovascular Negative   Respiratory As Noted in HPI   Gastrointestinal Negative   Genitourinary Negative   Musculoskeletal Negative   Integumentary Negative   Neurological Negative   Endocrine Negative   Other Symptoms Negative and None       Laboratory Results: No results found for this or any previous visit      Substance Abuse History:    Social History     Substance and Sexual Activity   Drug Use Yes    Types: Amphetamines, Benzodiazepines    Comment: Prescribed by provider       Family Psychiatric History:     Family History   Problem Relation Age of Onset    Depression Mother     Anxiety disorder Mother     Depression Father     Anxiety disorder Father        The following portions of the patient's history were reviewed and updated as appropriate: past family history, past medical history, past social history, past surgical history and problem list     Social History     Socioeconomic History    Marital status:      Spouse name: Not on file    Number of children: Not on file    Years of education: Not on file    Highest education level: Not on file   Occupational History    Not on file   Social Needs    Financial resource strain: Not on file    Food insecurity     Worry: Not on file     Inability: Not on file    Transportation needs     Medical: Not on file     Non-medical: Not on file   Tobacco Use    Smoking status: Former Smoker     Last attempt to quit: 2011     Years since quittin 4    Smokeless tobacco: Never Used   Substance and Sexual Activity    Alcohol use: Yes     Comment: Rare use    Drug use: Yes     Types: Amphetamines, Benzodiazepines     Comment: Prescribed by provider    Sexual activity: Not on file   Lifestyle    Physical activity     Days per week: Not on file     Minutes per session: Not on file    Stress: Not on file   Relationships    Social connections     Talks on phone: Not on file     Gets together: Not on file     Attends Anabaptism service: Not on file     Active member of club or organization: Not on file     Attends meetings of clubs or organizations: Not on file     Relationship status: Not on file    Intimate partner violence     Fear of current or ex partner: Not on file     Emotionally abused: Not on file     Physically abused: Not on file     Forced sexual activity: Not on file   Other Topics Concern    Not on file   Social History Narrative    Not on file     Social History     Social History Narrative    Not on file        Social History     Tobacco History     Smoking Status  Former Smoker Quit date  2011    Smokeless Tobacco Use  Never Used Alcohol History     Alcohol Use Status  Yes Comment  Rare use          Drug Use     Drug Use Status  Yes Types  Amphetamines, Benzodiazepines Comment  Prescribed by provider          Sexual Activity     Sexually Active  Not Asked          Activities of Daily Living    Not Asked                     OBJECTIVE:     Mental Status Evaluation:    Appearance age appropriate, casually dressed   Behavior pleasant, cooperative   Speech normal volume, normal pitch   Mood Stable   Affect Bright   Thought Processes logical   Associations intact associations   Thought Content Normal   Perceptual Disturbances: None   Abnormal Thoughts  Risk Potential Suicidal ideation - None  Homicidal ideation - None  Potential for aggression - No   Orientation oriented to person, place, time/date and situation   Memory recent and remote memory grossly intact   Cosciousness alert and awake   Attention Span Improved   Intellect Appears to be of Average Intelligence   Insight Good   Judgement Good   Muscle Strength and  Gait muscle strength and tone were normal   Language no difficulty naming common objects   Fund of Knowledge displays adequate knowledge of current events   Pain None   Pain Scale 0       Assessment/Plan:       Diagnoses and all orders for this visit:    Generalized anxiety disorder    Recurrent major depressive disorder, in partial remission (HCC)  -     citalopram (CeleXA) 20 mg tablet; 1 Daily  -     ALPRAZolam (XANAX) 2 MG tablet; 1 QID and 1 Extra PRN for breakthrough anxiety    Attention deficit hyperactivity disorder (ADHD), predominantly inattentive type  -     methylphenidate (RITALIN) 20 MG tablet; Take 1 tablet (20 mg total) by mouth 4 (four) times a dayMax Daily Amount: 80 mg          Treatment Recommendations/Precautions:    Continue current medications:  Continue Ritalin 20 mg q i d  Continue Xanax 2 mg q i d  And continue the 1 extra daily p r n  Shelia Skipper She does use this rarely    Continue Celexa 20 mg daily  Follow-up in 3 months  Risks/Benefits      Risks, Benefits And Possible Side Effects Of Medications:    Risks, benefits, and possible side effects of medications explained to patient and patient verbalizes understanding and agreement for treatment      Controlled Medication Discussion:     Not applicable    Psychotherapy Provided:     Individual psychotherapy provided: No

## 2020-11-05 DIAGNOSIS — F90.0 ATTENTION DEFICIT HYPERACTIVITY DISORDER (ADHD), PREDOMINANTLY INATTENTIVE TYPE: ICD-10-CM

## 2020-11-05 RX ORDER — METHYLPHENIDATE HYDROCHLORIDE 20 MG/1
20 TABLET ORAL 4 TIMES DAILY
Qty: 120 TABLET | Refills: 0 | Status: SHIPPED | OUTPATIENT
Start: 2020-11-05 | End: 2020-12-04 | Stop reason: SDUPTHER

## 2020-12-04 DIAGNOSIS — F90.0 ATTENTION DEFICIT HYPERACTIVITY DISORDER (ADHD), PREDOMINANTLY INATTENTIVE TYPE: ICD-10-CM

## 2020-12-07 RX ORDER — METHYLPHENIDATE HYDROCHLORIDE 20 MG/1
20 TABLET ORAL 4 TIMES DAILY
Qty: 120 TABLET | Refills: 0 | Status: SHIPPED | OUTPATIENT
Start: 2020-12-07 | End: 2021-03-03 | Stop reason: DRUGHIGH

## 2020-12-21 ENCOUNTER — TELEPHONE (OUTPATIENT)
Dept: PSYCHIATRY | Facility: CLINIC | Age: 60
End: 2020-12-21

## 2020-12-21 DIAGNOSIS — F33.41 RECURRENT MAJOR DEPRESSIVE DISORDER, IN PARTIAL REMISSION (HCC): ICD-10-CM

## 2020-12-21 RX ORDER — ALPRAZOLAM 2 MG/1
TABLET ORAL
Qty: 150 TABLET | Refills: 2 | Status: SHIPPED | OUTPATIENT
Start: 2020-12-21 | End: 2020-12-22 | Stop reason: SDUPTHER

## 2020-12-22 DIAGNOSIS — F33.41 RECURRENT MAJOR DEPRESSIVE DISORDER, IN PARTIAL REMISSION (HCC): ICD-10-CM

## 2020-12-22 RX ORDER — ALPRAZOLAM 2 MG/1
TABLET ORAL
Qty: 150 TABLET | Refills: 2 | Status: SHIPPED | OUTPATIENT
Start: 2020-12-22 | End: 2021-04-12 | Stop reason: SDUPTHER

## 2020-12-30 DIAGNOSIS — F90.0 ATTENTION DEFICIT HYPERACTIVITY DISORDER (ADHD), PREDOMINANTLY INATTENTIVE TYPE: ICD-10-CM

## 2020-12-30 RX ORDER — METHYLPHENIDATE HYDROCHLORIDE 20 MG/1
20 TABLET ORAL 4 TIMES DAILY
Qty: 120 TABLET | Refills: 0 | Status: SHIPPED | OUTPATIENT
Start: 2020-12-30 | End: 2021-02-03 | Stop reason: SDUPTHER

## 2021-02-03 DIAGNOSIS — F90.0 ATTENTION DEFICIT HYPERACTIVITY DISORDER (ADHD), PREDOMINANTLY INATTENTIVE TYPE: ICD-10-CM

## 2021-02-03 RX ORDER — METHYLPHENIDATE HYDROCHLORIDE 20 MG/1
20 TABLET ORAL 4 TIMES DAILY
Qty: 120 TABLET | Refills: 0 | Status: SHIPPED | OUTPATIENT
Start: 2021-02-03 | End: 2021-03-31 | Stop reason: SDUPTHER

## 2021-03-03 ENCOUNTER — TELEPHONE (OUTPATIENT)
Dept: PSYCHIATRY | Facility: CLINIC | Age: 61
End: 2021-03-03

## 2021-03-03 DIAGNOSIS — F90.0 ATTENTION DEFICIT HYPERACTIVITY DISORDER (ADHD), PREDOMINANTLY INATTENTIVE TYPE: ICD-10-CM

## 2021-03-03 RX ORDER — METHYLPHENIDATE HYDROCHLORIDE 20 MG/1
TABLET ORAL
Qty: 75 TABLET | Refills: 0 | Status: SHIPPED | OUTPATIENT
Start: 2021-03-03 | End: 2021-03-03 | Stop reason: DRUGHIGH

## 2021-03-03 RX ORDER — METHYLPHENIDATE HYDROCHLORIDE 20 MG/1
20 TABLET ORAL 4 TIMES DAILY
Qty: 120 TABLET | Refills: 0 | OUTPATIENT
Start: 2021-03-03

## 2021-03-03 RX ORDER — METHYLPHENIDATE HYDROCHLORIDE 20 MG/1
TABLET ORAL
Qty: 75 TABLET | Refills: 0 | Status: SHIPPED | OUTPATIENT
Start: 2021-03-03 | End: 2021-03-31 | Stop reason: DRUGHIGH

## 2021-03-31 ENCOUNTER — TELEPHONE (OUTPATIENT)
Dept: PSYCHIATRY | Facility: CLINIC | Age: 61
End: 2021-03-31

## 2021-03-31 DIAGNOSIS — F90.0 ATTENTION DEFICIT HYPERACTIVITY DISORDER (ADHD), PREDOMINANTLY INATTENTIVE TYPE: ICD-10-CM

## 2021-03-31 RX ORDER — METHYLPHENIDATE HYDROCHLORIDE 20 MG/1
TABLET ORAL
Qty: 90 TABLET | Refills: 0 | Status: SHIPPED | OUTPATIENT
Start: 2021-03-31 | End: 2021-04-29 | Stop reason: SDUPTHER

## 2021-04-12 DIAGNOSIS — F33.41 RECURRENT MAJOR DEPRESSIVE DISORDER, IN PARTIAL REMISSION (HCC): ICD-10-CM

## 2021-04-12 RX ORDER — ALPRAZOLAM 2 MG/1
TABLET ORAL
Qty: 150 TABLET | Refills: 0 | Status: SHIPPED | OUTPATIENT
Start: 2021-04-12 | End: 2021-05-18 | Stop reason: SDUPTHER

## 2021-04-12 RX ORDER — ALPRAZOLAM 2 MG/1
TABLET ORAL
Qty: 150 TABLET | Refills: 0 | Status: SHIPPED | OUTPATIENT
Start: 2021-04-12 | End: 2021-04-12 | Stop reason: SDUPTHER

## 2021-04-12 NOTE — TELEPHONE ENCOUNTER
Keith Franco, can you send the xanax to [facility], patient called back, no longer using [facility] that we had on file

## 2021-04-29 DIAGNOSIS — F90.0 ATTENTION DEFICIT HYPERACTIVITY DISORDER (ADHD), PREDOMINANTLY INATTENTIVE TYPE: ICD-10-CM

## 2021-04-29 RX ORDER — METHYLPHENIDATE HYDROCHLORIDE 20 MG/1
TABLET ORAL
Qty: 90 TABLET | Refills: 0 | Status: SHIPPED | OUTPATIENT
Start: 2021-04-29 | End: 2021-06-01 | Stop reason: SDUPTHER

## 2021-05-18 DIAGNOSIS — F33.41 RECURRENT MAJOR DEPRESSIVE DISORDER, IN PARTIAL REMISSION (HCC): ICD-10-CM

## 2021-05-18 RX ORDER — ALPRAZOLAM 2 MG/1
TABLET ORAL
Qty: 120 TABLET | Refills: 1 | Status: SHIPPED | OUTPATIENT
Start: 2021-05-18 | End: 2021-06-18 | Stop reason: SDUPTHER

## 2021-06-01 DIAGNOSIS — F90.0 ATTENTION DEFICIT HYPERACTIVITY DISORDER (ADHD), PREDOMINANTLY INATTENTIVE TYPE: ICD-10-CM

## 2021-06-01 RX ORDER — METHYLPHENIDATE HYDROCHLORIDE 20 MG/1
TABLET ORAL
Qty: 90 TABLET | Refills: 0 | Status: SHIPPED | OUTPATIENT
Start: 2021-06-01 | End: 2021-06-18 | Stop reason: SDUPTHER

## 2021-06-18 DIAGNOSIS — F33.41 RECURRENT MAJOR DEPRESSIVE DISORDER, IN PARTIAL REMISSION (HCC): ICD-10-CM

## 2021-06-18 RX ORDER — ALPRAZOLAM 2 MG/1
TABLET ORAL
Qty: 120 TABLET | Refills: 1 | Status: SHIPPED | OUTPATIENT
Start: 2021-06-18 | End: 2021-06-18 | Stop reason: SDUPTHER

## 2021-06-30 DIAGNOSIS — F90.0 ATTENTION DEFICIT HYPERACTIVITY DISORDER (ADHD), PREDOMINANTLY INATTENTIVE TYPE: ICD-10-CM

## 2021-06-30 RX ORDER — METHYLPHENIDATE HYDROCHLORIDE 20 MG/1
TABLET ORAL
Qty: 90 TABLET | Refills: 0 | Status: SHIPPED | OUTPATIENT
Start: 2021-07-01 | End: 2021-07-30 | Stop reason: SDUPTHER

## 2021-06-30 NOTE — TELEPHONE ENCOUNTER
Ritalin is on future refill for tomorrow but its going to [facility] would like it to go to [facility]

## 2021-07-30 DIAGNOSIS — F90.0 ATTENTION DEFICIT HYPERACTIVITY DISORDER (ADHD), PREDOMINANTLY INATTENTIVE TYPE: ICD-10-CM

## 2021-07-30 RX ORDER — METHYLPHENIDATE HYDROCHLORIDE 20 MG/1
TABLET ORAL
Qty: 90 TABLET | Refills: 0 | Status: SHIPPED | OUTPATIENT
Start: 2021-07-30 | End: 2021-08-05 | Stop reason: SDUPTHER

## 2021-08-05 ENCOUNTER — OFFICE VISIT (OUTPATIENT)
Dept: PSYCHIATRY | Facility: CLINIC | Age: 61
End: 2021-08-05

## 2021-08-05 DIAGNOSIS — F33.41 RECURRENT MAJOR DEPRESSIVE DISORDER, IN PARTIAL REMISSION (HCC): ICD-10-CM

## 2021-08-05 DIAGNOSIS — F90.0 ATTENTION DEFICIT HYPERACTIVITY DISORDER (ADHD), PREDOMINANTLY INATTENTIVE TYPE: ICD-10-CM

## 2021-08-05 PROCEDURE — 99213 OFFICE O/P EST LOW 20 MIN: CPT | Performed by: NURSE PRACTITIONER

## 2021-08-05 RX ORDER — METHYLPHENIDATE HYDROCHLORIDE 20 MG/1
TABLET ORAL
Qty: 90 TABLET | Refills: 0 | Status: SHIPPED | OUTPATIENT
Start: 2021-08-05 | End: 2021-09-21 | Stop reason: SDUPTHER

## 2021-08-05 RX ORDER — ALPRAZOLAM 2 MG/1
TABLET ORAL
Qty: 115 TABLET | Refills: 2 | Status: SHIPPED | OUTPATIENT
Start: 2021-08-05 | End: 2021-12-21 | Stop reason: SDUPTHER

## 2021-08-05 NOTE — PSYCH
PROGRESS NOTE        Jose Marlow      Name and Date of Birth:  Lacy Parent 57 y o  1960    Date of Visit: 08/05/21    SUBJECTIVE: patient is a 70-year-old female who has a history major depressive disorder and attention deficit disorder and anxiety disorder  Over the past several months, we have taken the time to start reducing her medications  She has successfully reduced Ritalin down to 20 mg t i d   Today, we are reducing Xanax down to 7 mg total per day verses 8 mg total per day  Prior to this, she was taking 10 mg total per day  We are  reducing it slow but sure and she is cooperative with this  She is proud to tell me that she recently had a new grandson  His name is Laverne Cummings  Otherwise, all is going well for her with no other new clinical concerns  Will follow up with her in 6 months  She denies suicidal ideation, intent or plan at present, has no suicidal ideation, intent or plan at present  She denies any auditory hallucinations and visual hallucinations, denies any other delusional thinking, denies any delusional thinking  She denies any side effects from medications    HPI ROS Appetite Changes and Sleep: normal appetite, normal sleep    Review Of Systems:      Constitutional Negative   ENT Negative   Cardiovascular Negative   Respiratory As Noted in HPI   Gastrointestinal Negative   Genitourinary Negative   Musculoskeletal Negative   Integumentary Negative   Neurological Negative   Endocrine Negative   Other Symptoms Negative and None       Laboratory Results: No results found for this or any previous visit      Substance Abuse History:    Social History     Substance and Sexual Activity   Drug Use Yes    Types: Amphetamines, Benzodiazepines    Comment: Prescribed by provider       Family Psychiatric History:     Family History   Problem Relation Age of Onset    Depression Mother     Anxiety disorder Mother     Depression Father     Anxiety disorder Father        The following portions of the patient's history were reviewed and updated as appropriate: past family history, past medical history, past social history, past surgical history and problem list     Social History     Socioeconomic History    Marital status:      Spouse name: Not on file    Number of children: Not on file    Years of education: Not on file    Highest education level: Not on file   Occupational History    Not on file   Tobacco Use    Smoking status: Former Smoker     Quit date: 2/23/2011     Years since quitting: 10 4    Smokeless tobacco: Never Used   Substance and Sexual Activity    Alcohol use: Yes     Comment: Rare use    Drug use: Yes     Types: Amphetamines, Benzodiazepines     Comment: Prescribed by provider    Sexual activity: Not on file   Other Topics Concern    Not on file   Social History Narrative    Not on file     Social Determinants of Health     Financial Resource Strain:     Difficulty of Paying Living Expenses:    Food Insecurity:     Worried About Running Out of Food in the Last Year:     [address] in the Last Year:    Transportation Needs:     Lack of Transportation (Medical):      Lack of Transportation (Non-Medical):    Physical Activity:     Days of Exercise per Week:     Minutes of Exercise per Session:    Stress:     Feeling of Stress :    Social Connections:     Frequency of Communication with Friends and Family:     Frequency of Social Gatherings with Friends and Family:     Attends Denominational Services:     Active Member of Clubs or Organizations:     Attends Club or Organization Meetings:     Marital Status:    Intimate Partner Violence:     Fear of Current or Ex-Partner:     Emotionally Abused:     Physically Abused:     Sexually Abused:      Social History     Social History Narrative    Not on file        Social History     Tobacco History     Smoking Status  Former Smoker Quit date  2/23/2011    Smokeless Tobacco Use  Never Used          Alcohol History     Alcohol Use Status  Yes Comment  Rare use          Drug Use     Drug Use Status  Yes Types  Amphetamines, Benzodiazepines Comment  Prescribed by provider          Sexual Activity     Sexually Active  Not Asked          Activities of Daily Living    Not Asked                     OBJECTIVE:     Mental Status Evaluation:    Appearance age appropriate, casually dressed   Behavior pleasant, cooperative   Speech normal volume, normal pitch   Mood   Bright and cheerful   Affect  bright   Thought Processes logical   Associations intact associations   Thought Content Normal   Perceptual Disturbances: None   Abnormal Thoughts  Risk Potential Suicidal ideation - None  Homicidal ideation - None  Potential for aggression - No   Orientation oriented to person, place, time/date and situation   Memory recent and remote memory grossly intact   Cosciousness alert and awake   Attention Span attention span and concentration are age appropriate   Intellect Appears to be of Average Intelligence   Insight age appropriate    Judgement good    Muscle Strength and  Gait muscle strength and tone were normal   Language no difficulty naming common objects   Fund of Knowledge displays adequate knowledge of current events   Pain None   Pain Scale 0       Assessment/Plan:       Diagnoses and all orders for this visit:    Recurrent major depressive disorder, in partial remission (HCC)  -     ALPRAZolam (XANAX) 2 MG tablet; 3 5 tabs Daily    Attention deficit hyperactivity disorder (ADHD), predominantly inattentive type  -     methylphenidate (RITALIN) 20 MG tablet; 3 Daily          Treatment Recommendations/Precautions:    Continue current medications:   Xanax 2 mg meds have been reduced to the patient taking 3 and half tablets per day  We will continue titration   Ritalin 20 mg t i d     Follow-up in 6 months    Risks/Benefits      Risks, Benefits And Possible Side Effects Of Medications:    Risks, benefits, and possible side effects of medications explained to patient and patient verbalizes understanding and agreement for treatment  Controlled Medication Discussion:  No history of any overuse or abuse of controlled substances      Not applicable    Psychotherapy Provided:     Individual psychotherapy provided: No

## 2021-08-05 NOTE — PSYCH
TREATMENT PLAN (Medication Management Only)        [facility]    Name and Date of Birth:  Jono Renteria 57 y o  1960  Date of Treatment Plan: August 5, 2021  Diagnosis/Diagnoses:    1  Recurrent major depressive disorder, in partial remission ([facility] [address] )    2  Attention deficit hyperactivity disorder (ADHD), predominantly inattentive type      Strengths/Personal Resources for Self-Care: supportive family, supportive friends  Area/Areas of need (in own words): "  Continue to reduce and titrate down on Xanax"  1  Long Term Goal: " reduce anxiety"  Target Date: 6 months - 2/5/2022  Person/Persons responsible for completion of goal: Gabriel Macias  2  Short Term Objective (s) - How will we reach this goal?:   A  Provider new recommended medication/dosage changes and/or continue medication(s): continue current medications as prescribed  B   N/A  Target Date: 6 months - 2/5/2022  Person/Persons Responsible for Completion of Goal: Gabriel Macias  Progress Towards Goals: stable  Treatment Modality: medication education at every visit  Review due 6 months from date of this plan: 6 months - 2/5/2022  Expected length of service: over 1 year  My Physician/PA/NP and I have developed this plan together and I agree to work on the goals and objectives  I understand the treatment goals that were developed for my treatment

## 2021-09-21 DIAGNOSIS — F90.0 ATTENTION DEFICIT HYPERACTIVITY DISORDER (ADHD), PREDOMINANTLY INATTENTIVE TYPE: ICD-10-CM

## 2021-09-22 RX ORDER — METHYLPHENIDATE HYDROCHLORIDE 20 MG/1
TABLET ORAL
Qty: 90 TABLET | Refills: 0 | Status: SHIPPED | OUTPATIENT
Start: 2021-09-22 | End: 2021-10-27 | Stop reason: SDUPTHER

## 2021-10-27 DIAGNOSIS — F90.0 ATTENTION DEFICIT HYPERACTIVITY DISORDER (ADHD), PREDOMINANTLY INATTENTIVE TYPE: ICD-10-CM

## 2021-10-27 DIAGNOSIS — F33.41 RECURRENT MAJOR DEPRESSIVE DISORDER, IN PARTIAL REMISSION (HCC): ICD-10-CM

## 2021-10-27 RX ORDER — METHYLPHENIDATE HYDROCHLORIDE 20 MG/1
TABLET ORAL
Qty: 90 TABLET | Refills: 0 | Status: SHIPPED | OUTPATIENT
Start: 2021-10-27 | End: 2021-11-23 | Stop reason: SDUPTHER

## 2021-11-23 DIAGNOSIS — F90.0 ATTENTION DEFICIT HYPERACTIVITY DISORDER (ADHD), PREDOMINANTLY INATTENTIVE TYPE: ICD-10-CM

## 2021-11-24 RX ORDER — METHYLPHENIDATE HYDROCHLORIDE 20 MG/1
TABLET ORAL
Qty: 90 TABLET | Refills: 0 | Status: SHIPPED | OUTPATIENT
Start: 2021-11-24 | End: 2021-12-22 | Stop reason: SDUPTHER

## 2021-12-21 DIAGNOSIS — F33.41 RECURRENT MAJOR DEPRESSIVE DISORDER, IN PARTIAL REMISSION (HCC): ICD-10-CM

## 2021-12-22 DIAGNOSIS — F90.0 ATTENTION DEFICIT HYPERACTIVITY DISORDER (ADHD), PREDOMINANTLY INATTENTIVE TYPE: ICD-10-CM

## 2021-12-22 RX ORDER — ALPRAZOLAM 2 MG/1
TABLET ORAL
Qty: 115 TABLET | Refills: 2 | Status: SHIPPED | OUTPATIENT
Start: 2021-12-22 | End: 2021-12-28 | Stop reason: SDUPTHER

## 2021-12-22 RX ORDER — METHYLPHENIDATE HYDROCHLORIDE 20 MG/1
TABLET ORAL
Qty: 90 TABLET | Refills: 0 | Status: SHIPPED | OUTPATIENT
Start: 2021-12-22 | End: 2022-01-24 | Stop reason: SDUPTHER

## 2021-12-28 DIAGNOSIS — F33.41 RECURRENT MAJOR DEPRESSIVE DISORDER, IN PARTIAL REMISSION (HCC): ICD-10-CM

## 2021-12-28 RX ORDER — ALPRAZOLAM 2 MG/1
TABLET ORAL
Qty: 115 TABLET | Refills: 2 | Status: CANCELLED | OUTPATIENT
Start: 2021-12-28

## 2022-01-24 DIAGNOSIS — F33.8 SEASONAL DEPRESSION (HCC): ICD-10-CM

## 2022-01-24 DIAGNOSIS — F33.1 MODERATE EPISODE OF RECURRENT MAJOR DEPRESSIVE DISORDER (HCC): Primary | ICD-10-CM

## 2022-01-24 DIAGNOSIS — F90.0 ATTENTION DEFICIT HYPERACTIVITY DISORDER (ADHD), PREDOMINANTLY INATTENTIVE TYPE: ICD-10-CM

## 2022-01-24 NOTE — TELEPHONE ENCOUNTER
Gwen Velez would like a script for Celexa but she would like it reduced to 10mg and a 30 day supply  Patient knows you are out of office till 1/26/22

## 2022-01-25 RX ORDER — METHYLPHENIDATE HYDROCHLORIDE 20 MG/1
TABLET ORAL
Qty: 90 TABLET | Refills: 0 | Status: SHIPPED | OUTPATIENT
Start: 2022-01-25 | End: 2022-02-07 | Stop reason: SDUPTHER

## 2022-01-25 RX ORDER — CITALOPRAM 10 MG/1
10 TABLET ORAL DAILY
Qty: 30 TABLET | Refills: 2 | Status: SHIPPED | OUTPATIENT
Start: 2022-01-25

## 2022-02-07 ENCOUNTER — OFFICE VISIT (OUTPATIENT)
Dept: PSYCHIATRY | Facility: CLINIC | Age: 62
End: 2022-02-07

## 2022-02-07 DIAGNOSIS — F90.0 ATTENTION DEFICIT HYPERACTIVITY DISORDER (ADHD), PREDOMINANTLY INATTENTIVE TYPE: ICD-10-CM

## 2022-02-07 DIAGNOSIS — F33.41 RECURRENT MAJOR DEPRESSIVE DISORDER, IN PARTIAL REMISSION (HCC): ICD-10-CM

## 2022-02-07 PROCEDURE — 99213 OFFICE O/P EST LOW 20 MIN: CPT | Performed by: NURSE PRACTITIONER

## 2022-02-07 RX ORDER — METHYLPHENIDATE HYDROCHLORIDE 20 MG/1
TABLET ORAL
Qty: 90 TABLET | Refills: 0
Start: 2022-03-02 | End: 2022-02-23 | Stop reason: SDUPTHER

## 2022-02-07 RX ORDER — ALPRAZOLAM 2 MG/1
TABLET ORAL
Qty: 100 TABLET | Refills: 2
Start: 2022-02-07 | End: 2022-03-31 | Stop reason: SDUPTHER

## 2022-02-07 NOTE — PSYCH
TREATMENT PLAN (Medication Management Only)        [facility]    Name and Date of Birth:  Dayron Quiñonez 57 y o  1960  Date of Treatment Plan: February 7, 2022  Diagnosis/Diagnoses:    1  Recurrent major depressive disorder, in partial remission ([facility] [address] )    2  Attention deficit hyperactivity disorder (ADHD), predominantly inattentive type      Strengths/Personal Resources for Self-Care: supportive family, supportive friends  Area/Areas of need (in own words): anxiety symptoms, depressive symptoms  1  Long Term Goal: continue improvement in acceptable anxiety level  Target Date: 6 months - 8/7/2022  Person/Persons responsible for completion of goal: Savana Acosta  2  Short Term Objective (s) - How will we reach this goal?:   A  Provider new recommended medication/dosage changes and/or continue medication(s): continue current medications as prescribed  B   N/A  Target Date: 6 months - 8/7/2022  Person/Persons Responsible for Completion of Goal: Savana Acosta  Progress Towards Goals: stable, continuing treatment  Treatment Modality: medication education at every visit  Review due 6 months from date of this plan: 6 months - 8/7/2022  Expected length of service: ongoing treatment unless revised  My Physician/PA/NP and I have developed this plan together and I agree to work on the goals and objectives  I understand the treatment goals that were developed for my treatment

## 2022-02-07 NOTE — PSYCH
PROGRESS NOTE        [facility]      Name and Date of Birth:  Ovidio Cantu 57 y o  1960    Date of Visit: 02/07/22    SUBJECTIVE:  Patient is a 53-year-old female has a history of attention deficit disorder, depressive disorder and anxiety disorder  She has seen a psychiatrist for many years and has been on high doses of Ritalin in high doses of Xanax  We are slowly but surely chipping away at those doses  As far as Xanax, the patient is now down to 6 mg a day from a total dose of 10 mg per day  We will now get her down to a total dose of 5 mg per day  For now she will continue on the Ritalin at 60 mg a day and after we have Xanax dose down further, we can then start to reduce Ritalin  For her, the Ritalin serves as somewhat of an antidepressant  For example, she was not taking the 60 mg a day total, felt low energy low interest and low motivation, went back on the 60 mg and was able to clean her house out of a lot of hoarding  She then was able to have a birthday party for her son and her grandson which was enjoyed by all  So for now, we will keep her on the 60 mg a day  She will follow-up with me in 1 year or sooner if necessary  She denies suicidal ideation, intent or plan at present, has no suicidal ideation, intent or plan at present  She denies any auditory hallucinations and visual hallucinations, denies any other delusional thinking, denies any delusional thinking  She denies any side effects from medications      HPI ROS Appetite Changes and Sleep: normal appetite, normal sleep    Review Of Systems:      Constitutional Negative   ENT Negative   Cardiovascular Negative   Respiratory As Noted in HPI   Gastrointestinal Negative   Genitourinary Negative   Musculoskeletal Negative   Integumentary Negative   Neurological Negative   Endocrine Negative   Other Symptoms Negative and None       Laboratory Results: No results found for this or any previous visit      Substance Abuse History:    Social History     Substance and Sexual Activity   Drug Use Yes    Types: Amphetamines, Benzodiazepines    Comment: Prescribed by provider       Family Psychiatric History:     Family History   Problem Relation Age of Onset    Depression Mother     Anxiety disorder Mother     Depression Father     Anxiety disorder Father        The following portions of the patient's history were reviewed and updated as appropriate: past family history, past medical history, past social history, past surgical history and problem list     Social History     Socioeconomic History    Marital status:      Spouse name: Not on file    Number of children: Not on file    Years of education: Not on file    Highest education level: Not on file   Occupational History    Not on file   Tobacco Use    Smoking status: Former Smoker     Quit date: 2/23/2011     Years since quitting: 10 9    Smokeless tobacco: Never Used   Substance and Sexual Activity    Alcohol use: Yes     Comment: Rare use    Drug use: Yes     Types: Amphetamines, Benzodiazepines     Comment: Prescribed by provider    Sexual activity: Not on file   Other Topics Concern    Not on file   Social History Narrative    Not on file     Social Determinants of Health     Financial Resource Strain: Not on file   Food Insecurity: Not on file   Transportation Needs: Not on file   Physical Activity: Not on file   Stress: Not on file   Social Connections: Not on file   Intimate Partner Violence: Not on file   Housing Stability: Not on file     Social History     Social History Narrative    Not on file        Social History     Tobacco History     Smoking Status  Former Smoker Quit date  2/23/2011    Smokeless Tobacco Use  Never Used          Alcohol History     Alcohol Use Status  Yes Comment  Rare use          Drug Use     Drug Use Status  Yes Types  Amphetamines, Benzodiazepines Comment  Prescribed by provider          Sexual Activity     Sexually Active  Not Asked          Activities of Daily Living    Not Asked                     OBJECTIVE:     Mental Status Evaluation:    Appearance age appropriate, casually dressed   Behavior pleasant, cooperative   Speech normal volume, normal pitch   Mood Less anxiety as we reduce then   Affect Bright affect   Thought Processes logical   Associations intact associations   Thought Content Normal   Perceptual Disturbances: None   Abnormal Thoughts  Risk Potential Suicidal ideation - None  Homicidal ideation - None  Potential for aggression - No   Orientation oriented to person, place, time/date and situation   Memory recent and remote memory grossly intact   Cosciousness alert and awake   Attention Span attention span and concentration are age appropriate   Intellect Appears to be of Average Intelligence   Insight Good   Judgement Good   Muscle Strength and  Gait muscle strength and tone were normal   Language no difficulty naming common objects   Fund of Knowledge displays adequate knowledge of current events   Pain None   Pain Scale 0       Assessment/Plan:       Diagnoses and all orders for this visit:    Recurrent major depressive disorder, in partial remission (HCC)  -     ALPRAZolam (XANAX) 2 MG tablet; 2 5 tabs Daily    Attention deficit hyperactivity disorder (ADHD), predominantly inattentive type  -     methylphenidate (RITALIN) 20 MG tablet; 3 Daily          Treatment Recommendations/Precautions:    Continue current medications:  Reduce Xanax 2 mg tablets, 2 5 mg daily  This is a continued reduction down from a total dose of 10 mg daily  Ritalin 20 mg t i d    Citalopram 10 mg daily  Follow-up in 1 year sooner if necessary  Risks/Benefits      Risks, Benefits And Possible Side Effects Of Medications:    Risks, benefits, and possible side effects of medications explained to patient and patient verbalizes understanding and agreement for treatment      Controlled Medication Discussion:  We had a lengthy discussion today regarding use of Ritalin and Xanax  Continue to reduce his dose of Xanax which she has been very successful over time  She is aware that Xanax has led to dependence and she agrees to continue to reduce the dose until we can get it down to p r n  use only   For now, the Ritalin is serving more as an antidepressant for her, she was able to accomplish more tasks at home and is functioning somewhat better  Once we reduce Xanax further, we can start to reduce some Ritalin  She has never overused or abused any controlled substance  Risk assessment:  Based on today's interview, the patient is not at risk of self-harm or harm to others      Not applicable    Psychotherapy Provided:     Individual psychotherapy provided: No

## 2022-02-23 DIAGNOSIS — F90.0 ATTENTION DEFICIT HYPERACTIVITY DISORDER (ADHD), PREDOMINANTLY INATTENTIVE TYPE: ICD-10-CM

## 2022-02-23 RX ORDER — METHYLPHENIDATE HYDROCHLORIDE 20 MG/1
TABLET ORAL
Qty: 90 TABLET | Refills: 0 | Status: SHIPPED | OUTPATIENT
Start: 2022-03-02 | End: 2022-02-24 | Stop reason: SDUPTHER

## 2022-02-24 ENCOUNTER — TELEPHONE (OUTPATIENT)
Dept: PSYCHIATRY | Facility: CLINIC | Age: 62
End: 2022-02-24

## 2022-02-24 DIAGNOSIS — F90.0 ATTENTION DEFICIT HYPERACTIVITY DISORDER (ADHD), PREDOMINANTLY INATTENTIVE TYPE: ICD-10-CM

## 2022-02-24 RX ORDER — METHYLPHENIDATE HYDROCHLORIDE 20 MG/1
TABLET ORAL
Qty: 90 TABLET | Refills: 0 | Status: SHIPPED | OUTPATIENT
Start: 2022-03-02 | End: 2022-02-25 | Stop reason: SDUPTHER

## 2022-02-25 DIAGNOSIS — F90.0 ATTENTION DEFICIT HYPERACTIVITY DISORDER (ADHD), PREDOMINANTLY INATTENTIVE TYPE: ICD-10-CM

## 2022-02-25 RX ORDER — METHYLPHENIDATE HYDROCHLORIDE 20 MG/1
TABLET ORAL
Qty: 90 TABLET | Refills: 0 | Status: SHIPPED | OUTPATIENT
Start: 2022-02-25 | End: 2022-03-30 | Stop reason: SDUPTHER

## 2022-03-30 DIAGNOSIS — F90.0 ATTENTION DEFICIT HYPERACTIVITY DISORDER (ADHD), PREDOMINANTLY INATTENTIVE TYPE: ICD-10-CM

## 2022-03-30 RX ORDER — METHYLPHENIDATE HYDROCHLORIDE 20 MG/1
TABLET ORAL
Qty: 90 TABLET | Refills: 0 | Status: SHIPPED | OUTPATIENT
Start: 2022-03-30 | End: 2022-05-12 | Stop reason: SDUPTHER

## 2022-03-31 ENCOUNTER — TELEPHONE (OUTPATIENT)
Dept: PSYCHIATRY | Facility: CLINIC | Age: 62
End: 2022-03-31

## 2022-05-12 DIAGNOSIS — F90.0 ATTENTION DEFICIT HYPERACTIVITY DISORDER (ADHD), PREDOMINANTLY INATTENTIVE TYPE: ICD-10-CM

## 2022-05-12 RX ORDER — METHYLPHENIDATE HYDROCHLORIDE 20 MG/1
TABLET ORAL
Qty: 90 TABLET | Refills: 0 | Status: SHIPPED | OUTPATIENT
Start: 2022-05-12 | End: 2022-06-24 | Stop reason: SDUPTHER

## 2022-06-24 DIAGNOSIS — F90.0 ATTENTION DEFICIT HYPERACTIVITY DISORDER (ADHD), PREDOMINANTLY INATTENTIVE TYPE: ICD-10-CM

## 2022-06-24 RX ORDER — METHYLPHENIDATE HYDROCHLORIDE 20 MG/1
TABLET ORAL
Qty: 90 TABLET | Refills: 0 | Status: SHIPPED | OUTPATIENT
Start: 2022-06-24 | End: 2022-08-04 | Stop reason: SDUPTHER

## 2022-07-27 DIAGNOSIS — F33.41 RECURRENT MAJOR DEPRESSIVE DISORDER, IN PARTIAL REMISSION (HCC): ICD-10-CM

## 2022-07-27 RX ORDER — ALPRAZOLAM 2 MG/1
TABLET ORAL
Qty: 75 TABLET | Refills: 2 | Status: SHIPPED | OUTPATIENT
Start: 2022-07-27

## 2022-08-04 DIAGNOSIS — F90.0 ATTENTION DEFICIT HYPERACTIVITY DISORDER (ADHD), PREDOMINANTLY INATTENTIVE TYPE: ICD-10-CM

## 2022-08-04 RX ORDER — METHYLPHENIDATE HYDROCHLORIDE 20 MG/1
20 TABLET ORAL
Qty: 90 TABLET | Refills: 0 | Status: SHIPPED | OUTPATIENT
Start: 2022-08-04 | End: 2022-09-15 | Stop reason: SDUPTHER

## 2022-09-15 DIAGNOSIS — F90.0 ATTENTION DEFICIT HYPERACTIVITY DISORDER (ADHD), PREDOMINANTLY INATTENTIVE TYPE: ICD-10-CM

## 2022-09-15 RX ORDER — METHYLPHENIDATE HYDROCHLORIDE 20 MG/1
20 TABLET ORAL
Qty: 90 TABLET | Refills: 0 | Status: SHIPPED | OUTPATIENT
Start: 2022-09-15

## 2022-11-01 DIAGNOSIS — F90.0 ATTENTION DEFICIT HYPERACTIVITY DISORDER (ADHD), PREDOMINANTLY INATTENTIVE TYPE: ICD-10-CM

## 2022-11-01 RX ORDER — METHYLPHENIDATE HYDROCHLORIDE 20 MG/1
20 TABLET ORAL
Qty: 90 TABLET | Refills: 0 | OUTPATIENT
Start: 2022-11-01

## 2022-11-02 ENCOUNTER — TELEPHONE (OUTPATIENT)
Dept: PSYCHIATRY | Facility: CLINIC | Age: 62
End: 2022-11-02

## 2022-11-02 DIAGNOSIS — F90.0 ATTENTION DEFICIT HYPERACTIVITY DISORDER (ADHD), PREDOMINANTLY INATTENTIVE TYPE: ICD-10-CM

## 2022-11-02 RX ORDER — METHYLPHENIDATE HYDROCHLORIDE 20 MG/1
20 TABLET ORAL
Qty: 90 TABLET | Refills: 0 | Status: SHIPPED | OUTPATIENT
Start: 2022-11-02 | End: 2022-11-02 | Stop reason: SDUPTHER

## 2022-11-02 RX ORDER — METHYLPHENIDATE HYDROCHLORIDE 20 MG/1
20 TABLET ORAL
Qty: 90 TABLET | Refills: 0 | Status: SHIPPED | OUTPATIENT
Start: 2022-11-02 | End: 2022-12-14 | Stop reason: SDUPTHER

## 2022-11-02 NOTE — TELEPHONE ENCOUNTER
[facility] has a ritalin shortage  So they were unable to process the refill   Please send new script to [facility]

## 2022-11-10 DIAGNOSIS — F33.41 RECURRENT MAJOR DEPRESSIVE DISORDER, IN PARTIAL REMISSION (HCC): ICD-10-CM

## 2022-11-10 RX ORDER — ALPRAZOLAM 2 MG/1
TABLET ORAL
Qty: 75 TABLET | Refills: 2 | Status: SHIPPED | OUTPATIENT
Start: 2022-11-10

## 2022-12-14 DIAGNOSIS — F90.0 ATTENTION DEFICIT HYPERACTIVITY DISORDER (ADHD), PREDOMINANTLY INATTENTIVE TYPE: ICD-10-CM

## 2022-12-14 RX ORDER — METHYLPHENIDATE HYDROCHLORIDE 20 MG/1
20 TABLET ORAL
Qty: 90 TABLET | Refills: 0 | Status: SHIPPED | OUTPATIENT
Start: 2022-12-14

## 2023-01-26 DIAGNOSIS — F90.0 ATTENTION DEFICIT HYPERACTIVITY DISORDER (ADHD), PREDOMINANTLY INATTENTIVE TYPE: ICD-10-CM

## 2023-01-26 RX ORDER — METHYLPHENIDATE HYDROCHLORIDE 20 MG/1
20 TABLET ORAL
Qty: 90 TABLET | Refills: 0 | Status: SHIPPED | OUTPATIENT
Start: 2023-01-26 | End: 2023-02-08 | Stop reason: SDUPTHER

## 2023-02-08 ENCOUNTER — OFFICE VISIT (OUTPATIENT)
Dept: PSYCHIATRY | Facility: CLINIC | Age: 63
End: 2023-02-08

## 2023-02-08 DIAGNOSIS — F90.0 ATTENTION DEFICIT HYPERACTIVITY DISORDER (ADHD), PREDOMINANTLY INATTENTIVE TYPE: ICD-10-CM

## 2023-02-08 DIAGNOSIS — F33.41 RECURRENT MAJOR DEPRESSIVE DISORDER, IN PARTIAL REMISSION (HCC): ICD-10-CM

## 2023-02-08 RX ORDER — METHYLPHENIDATE HYDROCHLORIDE 20 MG/1
20 TABLET ORAL
Qty: 90 TABLET | Refills: 0 | Status: SHIPPED | OUTPATIENT
Start: 2023-02-08

## 2023-02-08 RX ORDER — ALPRAZOLAM 2 MG/1
TABLET ORAL
Qty: 60 TABLET | Refills: 2 | Status: SHIPPED | OUTPATIENT
Start: 2023-02-08

## 2023-02-08 NOTE — PSYCH
TREATMENT PLAN (Medication Management Only)  Treatment Plan done but not signed at time of office visit due to:  Plan reviewed by phone or in person  and verbal consent given due to [address]    Name and Date of Birth:  Arcelia Bautista 60 y o  1960  Date of Treatment Plan: February 8, 2023  Diagnosis/Diagnoses:    1  Recurrent major depressive disorder, in partial remission ([facility] [address] )    2  Attention deficit hyperactivity disorder (ADHD), predominantly inattentive type      Strengths/Personal Resources for Self-Care: supportive family, supportive friends  Area/Areas of need (in own words): "" Doing really well  I am feeling good and enjoying my life "  1  Long Term Goal: " To continue to do well and function at her best "  Target Date: 6 months - 8/8/2023  Person/Persons responsible for completion of goal: Nita Guardado  2  Short Term Objective (s) - How will we reach this goal?:   A  Provider new recommended medication/dosage changes and/or continue medication(s): continue current medications as prescribed  B   N/A  Target Date: 6 months - 8/8/2023  Person/Persons Responsible for Completion of Goal: Nita Guardado  Progress Towards Goals: stable, continuing treatment  Treatment Modality: medication education at every visit  Review due 6 months from date of this plan: 6 months - 8/8/2023  Expected length of service: ongoing treatment unless revised  My Physician/PA/NP and I have developed this plan together and I agree to work on the goals and objectives  I understand the treatment goals that were developed for my treatment

## 2023-02-08 NOTE — PSYCH
PROGRESS NOTE        [facility]      Name and Date of Birth:  Johnny Ochoa 60 y o  1960    Date of Visit: 02/08/23    SUBJECTIVE: Patient is a 58-year-old female has a history of major depressive disorder and attention deficit disorder and anxiety disorder  On examination today, she is doing well  I see Luz Ordonez once a year and she takes her medications as prescribed and they benefit her  She is enjoying her life now is a new grandmother  She has a 3year-old grandson and she is going to have another grandchild which will be another grandson, born at the beginning of April  So she is enjoying them, she enjoys her family and she has no new complaints or concerns over the last year  She is sleeping well and eating well and staying social   Follow-up with me will be in 1 year  She will continue her current medication regimen  She denies suicidal ideation, intent or plan at present, has no suicidal ideation, intent or plan at present  She denies any auditory hallucinations and visual hallucinations, denies any other delusional thinking, denies any delusional thinking  She denies any side effects from medications    HPI ROS Appetite Changes and Sleep: normal appetite, normal sleep    Review Of Systems:      Constitutional Negative   ENT Negative   Cardiovascular Negative   Respiratory As Noted in HPI   Gastrointestinal Negative   Genitourinary Negative   Musculoskeletal Negative   Integumentary Negative   Neurological Negative   Endocrine Negative   Other Symptoms Negative and None       Laboratory Results: No results found for this or any previous visit      Substance Abuse History:    Social History     Substance and Sexual Activity   Drug Use Yes   • Types: Amphetamines, Benzodiazepines    Comment: Prescribed by provider       Family Psychiatric History:     Family History   Problem Relation Age of Onset   • Depression Mother    • Anxiety disorder Mother    • Depression Father    • Anxiety disorder Father        The following portions of the patient's history were reviewed and updated as appropriate: past family history, past medical history, past social history, past surgical history and problem list     Social History     Socioeconomic History   • Marital status:      Spouse name: Not on file   • Number of children: Not on file   • Years of education: Not on file   • Highest education level: Not on file   Occupational History   • Not on file   Tobacco Use   • Smoking status: Former     Types: Cigarettes     Quit date: 2011     Years since quittin 9   • Smokeless tobacco: Never   Substance and Sexual Activity   • Alcohol use: Yes     Comment: Rare use   • Drug use: Yes     Types: Amphetamines, Benzodiazepines     Comment: Prescribed by provider   • Sexual activity: Not on file   Other Topics Concern   • Not on file   Social History Narrative   • Not on file     Social Determinants of Health     Financial Resource Strain: Not on file   Food Insecurity: Not on file   Transportation Needs: Not on file   Physical Activity: Not on file   Stress: Not on file   Social Connections: Not on file   Intimate Partner Violence: Not on file   Housing Stability: Not on file     Social History     Social History Narrative   • Not on file        Social History     Tobacco History     Smoking Status  Former Quit Date  2011 Smoking Tobacco Type  Cigarettes quit in 2011    Smokeless Tobacco Use  Never          Alcohol History     Alcohol Use Status  Yes Comment  Rare use          Drug Use     Drug Use Status  Yes Types  Amphetamines, Benzodiazepines Comment  Prescribed by provider          Sexual Activity     Sexually Active  Not Asked          Activities of Daily Living    Not Asked                     OBJECTIVE:     Mental Status Evaluation:    Appearance age appropriate, casually dressed   Behavior pleasant, cooperative   Speech normal volume, normal pitch   Mood  stable mood   Affect  bright affect   Thought Processes logical   Associations intact associations   Thought Content Normal   Perceptual Disturbances: None   Abnormal Thoughts  Risk Potential Suicidal ideation - None  Homicidal ideation - None  Potential for aggression - No   Orientation oriented to person, place, time/date and situation   Memory recent and remote memory grossly intact   Cosciousness alert and awake   Attention Span attention span and concentration are age appropriate   Intellect Appears to be of Average Intelligence   Insight Good   Judgement Good   Muscle Strength and  Gait muscle strength and tone were normal   Language no difficulty naming common objects   Fund of Knowledge displays adequate knowledge of current events   Pain None   Pain Scale 0       Assessment/Plan:       Diagnoses and all orders for this visit:    Recurrent major depressive disorder, in partial remission (HCC)  -     ALPRAZolam (XANAX) 2 MG tablet; 1 BID    Attention deficit hyperactivity disorder (ADHD), predominantly inattentive type  -     methylphenidate (RITALIN) 20 MG tablet; Take 1 tablet (20 mg total) by mouth 3 (three) times a day with meals 3 Daily Max Daily Amount: 60 mg          Treatment Recommendations/Precautions:    Continue current medications:  Ritalin 20 mg 3 times daily  Alprazolam 2 mg twice daily  Patient only uses this as needed  There is no history of any overuse or abuse of any controlled substances  She fills all prescriptions on time  This is a reduction from a time when this patient was up to 8 mg/day  So she is doing very well on  titrating down the dose  Risks/Benefits      Risks, Benefits And Possible Side Effects Of Medications:    Risks, benefits, and possible side effects of medications explained to patient and patient verbalizes understanding and agreement for treatment  Controlled Medication Discussion: See above          Psychotherapy Provided: Individual psychotherapy provided: No

## 2023-06-01 DIAGNOSIS — F90.0 ATTENTION DEFICIT HYPERACTIVITY DISORDER (ADHD), PREDOMINANTLY INATTENTIVE TYPE: ICD-10-CM

## 2023-06-01 RX ORDER — METHYLPHENIDATE HYDROCHLORIDE 20 MG/1
20 TABLET ORAL
Qty: 90 TABLET | Refills: 0 | Status: SHIPPED | OUTPATIENT
Start: 2023-06-01

## 2023-06-14 DIAGNOSIS — F33.41 RECURRENT MAJOR DEPRESSIVE DISORDER, IN PARTIAL REMISSION (HCC): ICD-10-CM

## 2023-06-14 RX ORDER — ALPRAZOLAM 2 MG/1
TABLET ORAL
Qty: 60 TABLET | Refills: 2 | Status: SHIPPED | OUTPATIENT
Start: 2023-06-14

## 2023-07-13 DIAGNOSIS — F90.0 ATTENTION DEFICIT HYPERACTIVITY DISORDER (ADHD), PREDOMINANTLY INATTENTIVE TYPE: ICD-10-CM

## 2023-07-13 RX ORDER — METHYLPHENIDATE HYDROCHLORIDE 20 MG/1
20 TABLET ORAL
Qty: 90 TABLET | Refills: 0 | Status: SHIPPED | OUTPATIENT
Start: 2023-07-13

## 2023-07-13 NOTE — TELEPHONE ENCOUNTER
Medication Refill Request     Name of Medication Ritalin  Dose/Frequency 20mg 3 daily  Quantity 90 tablet  Verified pharmacy   [x]  Verified ordering Provider   [x]  Does patient have enough for the next 3 days? Yes [x] No []  Does patient have a follow-up appointment scheduled?  Yes [x] No []   If so when is appointment: 2/13

## 2023-08-24 DIAGNOSIS — F90.0 ATTENTION DEFICIT HYPERACTIVITY DISORDER (ADHD), PREDOMINANTLY INATTENTIVE TYPE: ICD-10-CM

## 2023-08-24 RX ORDER — METHYLPHENIDATE HYDROCHLORIDE 20 MG/1
20 TABLET ORAL
Qty: 90 TABLET | Refills: 0 | Status: SHIPPED | OUTPATIENT
Start: 2023-08-24

## 2023-10-04 DIAGNOSIS — F90.0 ATTENTION DEFICIT HYPERACTIVITY DISORDER (ADHD), PREDOMINANTLY INATTENTIVE TYPE: ICD-10-CM

## 2023-10-04 RX ORDER — METHYLPHENIDATE HYDROCHLORIDE 20 MG/1
20 TABLET ORAL
Qty: 90 TABLET | Refills: 0 | Status: SHIPPED | OUTPATIENT
Start: 2023-10-04

## 2023-10-04 NOTE — TELEPHONE ENCOUNTER
Medication: RITALIN    Dose/Frequency: 20 MG TABLET T IMES DAILY     Quantity: 90    Pharmacy: [facility] [address] [place] [address]

## 2023-12-21 ENCOUNTER — TELEPHONE (OUTPATIENT)
Dept: PSYCHIATRY | Facility: CLINIC | Age: 63
End: 2023-12-21

## 2023-12-21 DIAGNOSIS — F33.41 RECURRENT MAJOR DEPRESSIVE DISORDER, IN PARTIAL REMISSION (HCC): ICD-10-CM

## 2023-12-21 DIAGNOSIS — F90.0 ATTENTION DEFICIT HYPERACTIVITY DISORDER (ADHD), PREDOMINANTLY INATTENTIVE TYPE: Primary | ICD-10-CM

## 2023-12-21 RX ORDER — ALPRAZOLAM 2 MG/1
TABLET ORAL
Qty: 60 TABLET | Refills: 2 | Status: SHIPPED | OUTPATIENT
Start: 2023-12-21

## 2023-12-21 RX ORDER — METHYLPHENIDATE HYDROCHLORIDE 20 MG/1
20 TABLET ORAL
Qty: 90 TABLET | Refills: 0 | Status: SHIPPED | OUTPATIENT
Start: 2023-12-21

## 2023-12-21 NOTE — TELEPHONE ENCOUNTER
Please send Ritalin to [facility] and [place].        X[facility]ax send to [facility] [address]

## 2023-12-26 DIAGNOSIS — F90.0 ATTENTION DEFICIT HYPERACTIVITY DISORDER (ADHD), PREDOMINANTLY INATTENTIVE TYPE: Primary | ICD-10-CM

## 2023-12-26 RX ORDER — METHYLPHENIDATE HYDROCHLORIDE 54 MG/1
54 TABLET ORAL DAILY
Qty: 30 TABLET | Refills: 0 | Status: SHIPPED | OUTPATIENT
Start: 2023-12-26 | End: 2024-01-22 | Stop reason: SDUPTHER

## 2024-01-22 DIAGNOSIS — F90.0 ATTENTION DEFICIT HYPERACTIVITY DISORDER (ADHD), PREDOMINANTLY INATTENTIVE TYPE: ICD-10-CM

## 2024-01-22 RX ORDER — METHYLPHENIDATE HYDROCHLORIDE 54 MG/1
54 TABLET ORAL DAILY
Qty: 30 TABLET | Refills: 0 | Status: SHIPPED | OUTPATIENT
Start: 2024-01-22

## 2024-02-06 ENCOUNTER — TELEMEDICINE (OUTPATIENT)
Dept: PSYCHIATRY | Facility: CLINIC | Age: 64
End: 2024-02-06

## 2024-02-06 DIAGNOSIS — F33.41 RECURRENT MAJOR DEPRESSIVE DISORDER, IN PARTIAL REMISSION (HCC): ICD-10-CM

## 2024-02-06 DIAGNOSIS — F90.0 ATTENTION DEFICIT HYPERACTIVITY DISORDER (ADHD), PREDOMINANTLY INATTENTIVE TYPE: ICD-10-CM

## 2024-02-06 PROCEDURE — 99214 OFFICE O/P EST MOD 30 MIN: CPT | Performed by: NURSE PRACTITIONER

## 2024-02-06 RX ORDER — ALPRAZOLAM 2 MG/1
TABLET ORAL
Qty: 60 TABLET | Refills: 2 | Status: SHIPPED | OUTPATIENT
Start: 2024-02-21

## 2024-02-06 RX ORDER — METHYLPHENIDATE HYDROCHLORIDE 54 MG/1
54 TABLET ORAL DAILY
Qty: 30 TABLET | Refills: 0 | Status: SHIPPED | OUTPATIENT
Start: 2024-02-21

## 2024-02-06 NOTE — PSYCH
"TREATMENT PLAN (Medication Management Only)        [facility]    Name and Date of Birth:  Layne Goodman 63 y.o. 1960  Date of Treatment Plan: February 6, 2024  Diagnosis/Diagnoses:    1. Attention deficit hyperactivity disorder (ADHD), predominantly inattentive type    2. Recurrent major depressive disorder, in partial remission (HCC)      Strengths/Personal Resources for Self-Care: supportive family, supportive friends.  Area/Areas of need (in own words): \" I am doing well.  Everything is status quo\".  1. Long Term Goal: \" To continue to function at my best\".   Target Date: 6 months - 8/6/2024  Person/Persons responsible for completion of goal: Layne  2.  Short Term Objective (s) - How will we reach this goal?:   A.  Provider new recommended medication/dosage changes and/or continue medication(s): continue current medications as prescribed.  B.  N/A.    Target Date: 6 months - 8/6/2024  Person/Persons Responsible for Completion of Goal: Layne  Progress Towards Goals: stable, continuing treatment  Treatment Modality: medication education at every visit  Review due 6 months from date of this plan: 6 months - 8/6/2024  Expected length of service: ongoing treatment unless revised  My Physician/PA/NP and I have developed this plan together and I agree to work on the goals and objectives. I understand the treatment goals that were developed for my treatment.  "

## 2024-02-06 NOTE — PSYCH
Virtual Regular Visit    Verification of patient location:    Patient is located at Home in the following state in which I hold an active license [place]      Assessment/Plan:    Problem List Items Addressed This Visit    None  Visit Diagnoses       Attention deficit hyperactivity disorder (ADHD), predominantly inattentive type        Relevant Medications    methylphenidate (Concerta) 54 MG ER tablet (Start on 2/21/2024)    ALPRAZolam (XANAX) 2 MG tablet (Start on 2/21/2024)    Recurrent major depressive disorder, in partial remission (HCC)        Relevant Medications    methylphenidate (Concerta) 54 MG ER tablet (Start on 2/21/2024)    ALPRAZolam (XANAX) 2 MG tablet (Start on 2/21/2024)            Goals addressed in session: Maintain current level of stability         Reason for visit is   Chief Complaint   Patient presents with    Virtual Regular Visit          Encounter provider YASMANY Holt    Provider located at [address] [address]      Recent Visits  No visits were found meeting these conditions.  Showing recent visits within past 7 days and meeting all other requirements  Today's Visits  Date Type Provider Dept   02/06/24 Telemedicine YASMANY Holt  [facility]   Showing today's visits and meeting all other requirements  Future Appointments  No visits were found meeting these conditions.  Showing future appointments within next 150 days and meeting all other requirements       The patient was identified by name and date of birth. Layne Goodman was informed that this is a telemedicine visit and that the visit is being conducted throughthe Simfinit platform. She agrees to proceed..  My office door was closed. No one else was in the room.  She acknowledged consent and understanding of privacy and security of the video platform. The patient has agreed to participate and  understands they can discontinue the visit at any time.    Patient is aware this is a billable service.     Subjective  Layne Goodman is a 63 y.o. female has a history of anxiety disorder and attention deficit disorder.  Has been doing very well on this current medication regimen.  He has successfully eliminated the Ritalin and she is on Concerta extended release 54 mg daily which works well for her.  She is sleeping well and eating well.  Stays very social and involved with her family and they are getting along well.  Enjoying her grandchildren.  Still all prescriptions on time with no issues.  Continue current meds and treatment and follow-up with me will be in 1 year or sooner if necessary.  Mental status exam: Patient is awake and alert and oriented x 3.  Mood is stable.  Patient is not suicidal, not homicidal and not psychotic.  Speech is clear and thoughts are well organized, coherent and goal directed.  Attention span is good.  Impulse control is good.  Judgment and insight are good.  Memory is good.  Associations are intact.  No overt delusions.  Patient will continue on:  Xanax 2 mg twice daily  Concerta 54 mg daily  Follow-up with me in 1 year or sooner if necessary.    Safety plan: Patient is aware of the 24-hour on-call service offered by [facility].  Patient is also aware of the 24-hour crisis call service offered by the [facility].  Patient agrees to contact 911 and go directly to the emergency room if ever she begins to experience any suicidal ideation.    Controlled substance discussion: Patient fills all prescriptions appropriately.  There is no evidence of any overuse or abuse of any controlled substances..          Past Medical History:   Diagnosis Date    Anxiety     Depression        Past Surgical History:   Procedure Laterality Date     SECTION      FRACTURE SURGERY      HERNIA REPAIR         Current Outpatient Medications   Medication Sig Dispense Refill    [START ON 2024]  ALPRAZolam (XANAX) 2 MG tablet 1 BID Do not start before February 21, 2024. 60 tablet 2    [START ON 2/21/2024] methylphenidate (Concerta) 54 MG ER tablet Take 1 tablet (54 mg total) by mouth daily Max Daily Amount: 54 mg Do not start before February 21, 2024. 30 tablet 0    citalopram (CeleXA) 10 mg tablet Take 1 tablet (10 mg total) by mouth daily 30 tablet 2     No current facility-administered medications for this visit.        No Known Allergies    Review of Systems    Video Exam    There were no vitals filed for this visit.    Physical Exam     Visit Time    Visit Start Time: 2:30p  Visit Stop Time: 3:00p  Total Visit Duration: 30 minutes were spent in the visit.  Time was spent reviewing the treatment plan, reviewing medications and completing the progress note.

## 2024-03-22 DIAGNOSIS — F90.0 ATTENTION DEFICIT HYPERACTIVITY DISORDER (ADHD), PREDOMINANTLY INATTENTIVE TYPE: ICD-10-CM

## 2024-03-22 RX ORDER — METHYLPHENIDATE HYDROCHLORIDE 54 MG/1
54 TABLET ORAL DAILY
Qty: 30 TABLET | Refills: 0 | Status: SHIPPED | OUTPATIENT
Start: 2024-03-22

## 2024-03-22 NOTE — TELEPHONE ENCOUNTER
Patient left message requesting Concerta 54 mg. [facility] pharmacy [address] [facility].

## 2024-04-22 DIAGNOSIS — F90.0 ATTENTION DEFICIT HYPERACTIVITY DISORDER (ADHD), PREDOMINANTLY INATTENTIVE TYPE: ICD-10-CM

## 2024-04-22 RX ORDER — METHYLPHENIDATE HYDROCHLORIDE 54 MG/1
54 TABLET ORAL DAILY
Qty: 30 TABLET | Refills: 0 | Status: SHIPPED | OUTPATIENT
Start: 2024-04-22

## 2024-04-22 NOTE — TELEPHONE ENCOUNTER
Patient called to request Concerta 54 mg. [facility] pharmacy [address] [facility].

## 2024-05-22 DIAGNOSIS — F90.0 ATTENTION DEFICIT HYPERACTIVITY DISORDER (ADHD), PREDOMINANTLY INATTENTIVE TYPE: ICD-10-CM

## 2024-05-22 DIAGNOSIS — F33.41 RECURRENT MAJOR DEPRESSIVE DISORDER, IN PARTIAL REMISSION (HCC): ICD-10-CM

## 2024-05-22 RX ORDER — ALPRAZOLAM 2 MG/1
TABLET ORAL
Qty: 60 TABLET | Refills: 0 | Status: SHIPPED | OUTPATIENT
Start: 2024-05-22

## 2024-05-22 RX ORDER — METHYLPHENIDATE HYDROCHLORIDE 54 MG/1
54 TABLET ORAL DAILY
Qty: 30 TABLET | Refills: 0 | Status: SHIPPED | OUTPATIENT
Start: 2024-05-22 | End: 2024-05-22 | Stop reason: SDUPTHER

## 2024-05-22 RX ORDER — METHYLPHENIDATE HYDROCHLORIDE 54 MG/1
54 TABLET ORAL DAILY
Qty: 30 TABLET | Refills: 0 | Status: SHIPPED | OUTPATIENT
Start: 2024-05-22

## 2024-05-22 RX ORDER — ALPRAZOLAM 2 MG/1
TABLET ORAL
Qty: 60 TABLET | Refills: 0 | Status: SHIPPED | OUTPATIENT
Start: 2024-05-22 | End: 2024-05-22 | Stop reason: SDUPTHER

## 2024-05-22 NOTE — TELEPHONE ENCOUNTER
Patient Left message requesting Concerta 54 mg and Xanax 2 mg.   [id number] - [place], [place] - [address] [place] [address]

## 2024-05-22 NOTE — TELEPHONE ENCOUNTER
Please resent  Concerta 54 mg and Xanax 2 mg. to   [id number] - [place], [place] - [address] [facility]

## 2024-06-20 DIAGNOSIS — F90.0 ATTENTION DEFICIT HYPERACTIVITY DISORDER (ADHD), PREDOMINANTLY INATTENTIVE TYPE: ICD-10-CM

## 2024-06-20 DIAGNOSIS — F33.41 RECURRENT MAJOR DEPRESSIVE DISORDER, IN PARTIAL REMISSION (HCC): ICD-10-CM

## 2024-06-20 RX ORDER — ALPRAZOLAM 2 MG/1
TABLET ORAL
Qty: 60 TABLET | Refills: 2 | Status: SHIPPED | OUTPATIENT
Start: 2024-06-20

## 2024-06-20 RX ORDER — ALPRAZOLAM 2 MG/1
TABLET ORAL
Qty: 60 TABLET | Refills: 2 | Status: SHIPPED | OUTPATIENT
Start: 2024-06-20 | End: 2024-06-20 | Stop reason: SDUPTHER

## 2024-06-20 RX ORDER — METHYLPHENIDATE HYDROCHLORIDE 54 MG/1
54 TABLET ORAL DAILY
Qty: 30 TABLET | Refills: 0 | Status: SHIPPED | OUTPATIENT
Start: 2024-06-20 | End: 2024-06-20 | Stop reason: SDUPTHER

## 2024-06-20 RX ORDER — METHYLPHENIDATE HYDROCHLORIDE 54 MG/1
54 TABLET ORAL DAILY
Qty: 30 TABLET | Refills: 0 | Status: SHIPPED | OUTPATIENT
Start: 2024-06-20

## 2024-06-20 NOTE — TELEPHONE ENCOUNTER
Patient called to request Xanax 10 mg and Concerta 54 mg. [facility] [place], [address]

## 2024-07-23 DIAGNOSIS — F90.0 ATTENTION DEFICIT HYPERACTIVITY DISORDER (ADHD), PREDOMINANTLY INATTENTIVE TYPE: ICD-10-CM

## 2024-07-23 RX ORDER — METHYLPHENIDATE HYDROCHLORIDE 54 MG/1
54 TABLET ORAL DAILY
Qty: 30 TABLET | Refills: 0 | Status: SHIPPED | OUTPATIENT
Start: 2024-07-23

## 2024-07-23 NOTE — TELEPHONE ENCOUNTER
Reason for call:   [x] Refill   [] Prior Auth  [] Other:     Office:   [] PCP/Provider -   [x] Specialty/Provider - [facility]    Medication: Methylphenidate 54mg    Dose/Frequency: 1 tab daily    Quantity: 30    Pharmacy: [id number] - [place] - [address] [facility] [phone]     Does the patient have enough for 3 days?   [] Yes   [x] No - pt will take last tab tomorrow 7/24

## 2024-08-22 DIAGNOSIS — F90.0 ATTENTION DEFICIT HYPERACTIVITY DISORDER (ADHD), PREDOMINANTLY INATTENTIVE TYPE: ICD-10-CM

## 2024-08-22 RX ORDER — METHYLPHENIDATE HYDROCHLORIDE 54 MG/1
54 TABLET ORAL DAILY
Qty: 30 TABLET | Refills: 0 | Status: SHIPPED | OUTPATIENT
Start: 2024-08-22 | End: 2024-08-23 | Stop reason: SDUPTHER

## 2024-08-22 NOTE — TELEPHONE ENCOUNTER
Refill must be reviewed and completed by the office or provider. The refill is unable to be approved or denied by the medication management team. Cannot be delegated      08/17/2024 06/20/2024 ALPRAZolam (Tablet) 60.0 30 2 MG NA [employer] 2 / 2 [place]      1 [id number] 07/23/2024 07/23/2024 Methylphenidate Hcl (Tablet, Extended Release) 30.0 30 54 MG NA [employer]. [place] / 0 [place]    1 [id number] 07/18/2024 06/20/2024 ALPRAZolam (Tablet) 60.0 30 2 MG NA [employer] 1 / 2 [place]    1 [id number] 06/20/2024 06/20/2024 ALPRAZolam (Tablet) 60.0 30 2 MG NA [employer]. [place]    1 [id number] 06/20/2024 06/20/2024 Methylphenidate Hcl (Tablet, Extended Release) 30.0 30 54 MG NA [employer]. [place]    1 [id number] 05/22/2024 05/22/2024 ALPRAZolam (Tablet) 60.0 30 2 MG NA [employer] 0 / 0 [place]    1 [id number] 05/22/2024 05/22/2024 Methylphenidate Hcl (Tablet, Extended Release) 20.0 20 54 MG NA [employer]

## 2024-08-22 NOTE — TELEPHONE ENCOUNTER
Reason for call:   [x] Refill   [] Prior Auth  [] Other:     Office:   [] PCP/Provider -   [x] Specialty/Provider - PSYC    Medication: CONCERTA    Dose/Frequency: 54 MG    Quantity: 30    Pharmacy:   [facility] [place] [id number] - [place] - [address] [place] [address] [place] [address]  Phone: [phone]  Fax: [phone]     Does the patient have enough for 3 days?   [] Yes   [x] No - Send as HP to POD

## 2024-08-23 DIAGNOSIS — F90.0 ATTENTION DEFICIT HYPERACTIVITY DISORDER (ADHD), PREDOMINANTLY INATTENTIVE TYPE: ICD-10-CM

## 2024-08-23 RX ORDER — METHYLPHENIDATE HYDROCHLORIDE 54 MG/1
54 TABLET ORAL DAILY
Qty: 20 TABLET | Refills: 0 | Status: SHIPPED | OUTPATIENT
Start: 2024-08-23

## 2024-08-23 NOTE — TELEPHONE ENCOUNTER
Per message of Milagros Araiza:  She spoke with [facility] and they have 20 tabs available they are filling; Milagros should call when she needs additional medication. She verbalized understanding and was very appreciative for everyone's efforts to help her get the medication.

## 2024-08-23 NOTE — TELEPHONE ENCOUNTER
Patient called the Rx Refill Line stating that the pharmacy is unable to get this medication in stock and is recommending that she be put on something different as she will be out of medication tomorrow morning. Please advise and contact the patient to further discuss.

## 2024-09-12 DIAGNOSIS — F33.41 RECURRENT MAJOR DEPRESSIVE DISORDER, IN PARTIAL REMISSION (HCC): ICD-10-CM

## 2024-09-12 RX ORDER — ALPRAZOLAM 2 MG
TABLET ORAL
Qty: 60 TABLET | Refills: 5 | Status: SHIPPED | OUTPATIENT
Start: 2024-09-12

## 2024-09-12 NOTE — TELEPHONE ENCOUNTER
Reason for call:   [x] Refill   [] Prior Auth  [] Other:     Office:   [] PCP/Provider -   [x] Specialty/Provider -     Medication: ALPRAZolam (XANAX) 2 MG tablet     Dose/Frequency: 1 BID, Normal     Quantity: 60 tablet     Pharmacy: [id number] - [place] - [address] [place] [phone]     Does the patient have enough for 3 days?   [x] Yes   [] No - Send as HP to POD

## 2024-09-12 NOTE — TELEPHONE ENCOUNTER
Medication:  PDMP 08/17/2024 06/20/2024 ALPRAZolam (Tablet) 60.0 30 2 MG NA [employer] 2 / 2 [place]   1 [id number] 07/23/2024 07/23/2024 Methylphenidate Hcl (Tablet, Extended Release) 30.0 30 54 MG NA [employer] 0 / 0 [place]   1 [id number] 07/18/2024 06/20/2024 ALPRAZolam (Tablet) 60.0 30 2 MG NA [employer] Commercial [employer] 1 / 2 [place]   1 [id number] 06/20/2024 06/20/2024 Methylphenidate Hcl (Tablet, Extended Release) 30.0 30 54 MG NA [employer] Commercial [employer] 0 / 0 [place]   1 [id number] 06/20/2024 06/20/2024 ALPRAZolam (Tablet) 60.0 30 2 MG NA [employer]  Active agreement on file -

## 2024-09-16 DIAGNOSIS — F90.0 ATTENTION DEFICIT HYPERACTIVITY DISORDER (ADHD), PREDOMINANTLY INATTENTIVE TYPE: ICD-10-CM

## 2024-09-16 RX ORDER — METHYLPHENIDATE HYDROCHLORIDE 54 MG/1
54 TABLET ORAL DAILY
Qty: 30 TABLET | Refills: 0 | Status: SHIPPED | OUTPATIENT
Start: 2024-09-16

## 2024-09-16 NOTE — TELEPHONE ENCOUNTER
Patient [place] the beach on [place] and asked if the prescription could please be sent to the pharmacy as soon as possible.  Patient mentioned the pharmacy only dispensed 20 tablets at last fill due to that was all they had in stock at the time.     Reason for call:   [x] Refill   [] Prior Auth  [] Other:     Office:   [] PCP/Provider -   [x] Specialty/Provider - Psychiatry    Medication: methylphenidate (Concerta) 54 MG ER tablet     Dose/Frequency:     54 mg, Oral, Daily       Quantity: 30    Pharmacy: [id number] [address]     Does the patient have enough for 3 days?   [] Yes   [x] No - Send as HP to POD

## 2024-10-24 DIAGNOSIS — F90.0 ATTENTION DEFICIT HYPERACTIVITY DISORDER (ADHD), PREDOMINANTLY INATTENTIVE TYPE: ICD-10-CM

## 2024-10-24 RX ORDER — METHYLPHENIDATE HYDROCHLORIDE 54 MG/1
54 TABLET ORAL DAILY
Qty: 30 TABLET | Refills: 0 | Status: SHIPPED | OUTPATIENT
Start: 2024-10-24

## 2024-10-24 NOTE — TELEPHONE ENCOUNTER
Reason for call:   [x] Refill   [] Prior Auth  [] Other:     Office:   [] PCP/Provider -   [x] Specialty/Provider - Psych    Medication: Methylphenidate 54 mg, take 1 tablet by mouth daily      Pharmacy: [facility]     Does the patient have enough for 3 days?   [x] Yes   [] No - Send as HP to POD

## 2024-11-26 DIAGNOSIS — F90.0 ATTENTION DEFICIT HYPERACTIVITY DISORDER (ADHD), PREDOMINANTLY INATTENTIVE TYPE: ICD-10-CM

## 2024-11-26 RX ORDER — METHYLPHENIDATE HYDROCHLORIDE 54 MG/1
54 TABLET ORAL DAILY
Qty: 30 TABLET | Refills: 0 | Status: SHIPPED | OUTPATIENT
Start: 2024-11-26

## 2024-11-26 NOTE — TELEPHONE ENCOUNTER
11/08/2024 09/12/2024 ALPRAZolam (Tablet) 60.0 30 [facility] [employer] 2 / 5 [place]   1 [id number] 10/24/2024 10/24/2024 Methylphenidate Hcl (Tablet, Extended Release) 30.0 30 54 [facility] [employer] [place] / [place]   1 [id number] 10/11/2024 09/12/2024 ALPRAZolam (Tablet) 60.0 30 [facility] [employer] [place] 1 / 5 [place]   1 [id number] 09/17/2024 09/16/2024 Methylphenidate Hcl (Tablet, Extended Release) 30.0 30 54 MG NA [facility]SE  [facility] [employer]. [place] 0 / 0 [place]   1 [id number] 09/15/2024 09/12/2024 ALPRAZolam (Tablet) 60.0 30 2 MG NA JOSE  [facility] [place] [employer]. [place] 0 / 5 [place]   1 [id number] 08/23/2024 08/23/2024 Methylphenidate Hcl (Tablet, Extended Release) 20.0 20 54 MG NA [employer]. Commercial Insurance 0 / 0 [place]   1 [id number] 08/17/2024 06/20/2024 ALPRAZolam (Tablet) 60.0 30 2 MG NA JOSE  [facility] [place] [employer]. Commercial Insurance 2 / 2 [place]   1 [id number] 07/23/2024 07/23/2024 Methylphenidate Hcl (Tablet, Extended Release) 30.0 30 54 MG NA JOSE  [facility] [place] [employer]. Commercial Insurance 0 / 0 [place]   1 [id number] 07/18/2024 06/20/2024 ALPRAZolam (Tablet) 60.0 30 2 MG NA [employer]  [facility] [place] [employer]. Commercial Insurance 1 / 2 [place]   1 [id number] 06/20/2024 06/20/2024 ALPRAZolam (Tablet) 60.0 30 2 MG NA [employer]  [facility] [place] [employer]. Commercial Insurance 0 / 2

## 2024-11-26 NOTE — TELEPHONE ENCOUNTER
Reason for call:   [x] Refill   [] Prior Auth  [] Other:     Office:   [] PCP/Provider -   [x] Specialty/Provider - PSYCHIATRIC ASSJAYASHREE PAULROBIN  Authorized By: YASMANY Holt    Medication: methylphenidate (Concerta) 54 MG ER tablet     Dose/Frequency: Take 1 tablet (54 mg total) by mouth daily     Quantity: 30 tablet     Pharmacy: [facility] [address] [place] [address]     Does the patient have enough for 3 days?   [x] Yes   [] No - Send as HP to POD

## 2025-01-13 DIAGNOSIS — F90.0 ATTENTION DEFICIT HYPERACTIVITY DISORDER (ADHD), PREDOMINANTLY INATTENTIVE TYPE: ICD-10-CM

## 2025-01-13 NOTE — TELEPHONE ENCOUNTER
Reason for call:   [] Refill   [] Prior Auth  [] Other:     Office:   [] PCP/Provider -   [x] Specialty/Provider - [facility] / YASMANY Holt     Medication:   ~ methylphenidate (Concerta) 54 MG ER tablet - Take 1 tablet (54 mg total) by mouth daily Max Daily Amount: 54 mg     Pharmacy:   [facility] [address] [place], [place] [address]     Does the patient have enough for 3 days?   [] Yes   [x] No - Send as HP to POD

## 2025-01-14 RX ORDER — METHYLPHENIDATE HYDROCHLORIDE 54 MG/1
54 TABLET ORAL DAILY
Qty: 30 TABLET | Refills: 0 | Status: SHIPPED | OUTPATIENT
Start: 2025-01-14

## 2025-01-31 ENCOUNTER — TELEPHONE (OUTPATIENT)
Dept: PSYCHIATRY | Facility: CLINIC | Age: 65
End: 2025-01-31

## 2025-01-31 ENCOUNTER — TELEPHONE (OUTPATIENT)
Age: 65
End: 2025-01-31

## 2025-01-31 NOTE — TELEPHONE ENCOUNTER
Writer  called and spoke with patient in regards to adding insurance to patient's appt on Monday. The patient stated she will call back to provide insurance information.

## 2025-01-31 NOTE — TELEPHONE ENCOUNTER
Patient called in to update insurance.    Writer was provided the following insurance information:     [employer]  ID: [id number]    [facility]  ID: [id number]    Writer was unable to successfully E-verify the insurances, and the promise website was unreachable.  Supervisor also, tried to access promise, but was unsuccessful.     Patient advised that we are unable to verify the insurance, at this time- and that we will contact the patient by Monday, 2.3.25 prior to the appointment @ 1:00pm-regarding any insurance updates.    Writer has routed the message to the clerical department for awareness regarding insurance.

## 2025-01-31 NOTE — TELEPHONE ENCOUNTER
Pt called to let us know that she called the insurance company and verified the ID number that she is supposed to use. The insurance stated it is [employer] PPO and the number is [id number].  Promise is down and unable to e-verify. Patient is coming in on Monday 2/3/25 and will bring her card.

## 2025-02-03 ENCOUNTER — TELEMEDICINE (OUTPATIENT)
Dept: PSYCHIATRY | Facility: CLINIC | Age: 65
End: 2025-02-03
Payer: COMMERCIAL

## 2025-02-03 ENCOUNTER — TELEPHONE (OUTPATIENT)
Age: 65
End: 2025-02-03

## 2025-02-03 DIAGNOSIS — F90.0 ATTENTION DEFICIT HYPERACTIVITY DISORDER (ADHD), PREDOMINANTLY INATTENTIVE TYPE: Primary | ICD-10-CM

## 2025-02-03 DIAGNOSIS — F41.1 GAD (GENERALIZED ANXIETY DISORDER): ICD-10-CM

## 2025-02-03 DIAGNOSIS — F33.41 RECURRENT MAJOR DEPRESSIVE DISORDER, IN PARTIAL REMISSION (HCC): ICD-10-CM

## 2025-02-03 PROCEDURE — 99214 OFFICE O/P EST MOD 30 MIN: CPT | Performed by: NURSE PRACTITIONER

## 2025-02-03 RX ORDER — ALPRAZOLAM 2 MG/1
TABLET ORAL
Qty: 60 TABLET | Refills: 5 | Status: SHIPPED | OUTPATIENT
Start: 2025-02-03

## 2025-02-03 RX ORDER — METHYLPHENIDATE HYDROCHLORIDE 36 MG/1
36 TABLET ORAL DAILY
Qty: 30 TABLET | Refills: 0 | Status: SHIPPED | OUTPATIENT
Start: 2025-02-03

## 2025-02-03 NOTE — TELEPHONE ENCOUNTER
Patient called in to advise that they did not see the My Chart link, to connect to the appointment.     Patient was able to locate the link, and connect.    Patient advised they were not prompted to pay up front for the appointment, while checking in. Writer advised the patient they can pay their copay, post visit if the chart prompts them to do so.

## 2025-02-04 NOTE — PSYCH
TREATMENT PLAN (Medication Management Only)        [facility]    Name and Date of Birth:  Layne Goodman 64 y.o. 1960  Date of Treatment Plan: February 3, 2025  Diagnosis/Diagnoses:    1. Attention deficit hyperactivity disorder (ADHD), predominantly inattentive type    2. Recurrent major depressive disorder, in partial remission (HCC)    3. NHAN (generalized anxiety disorder)      Strengths/Personal Resources for Self-Care: supportive family.  Area/Areas of need (in own words): anxiety symptoms.  1. Long Term Goal: continue improvement in acceptable anxiety level.   Target Date: 1 year - 2/3/2026  Person/Persons responsible for completion of goal: YASMANY Salvador  2.  Short Term Objective (s) - How will we reach this goal?:   A.  Provider new recommended medication/dosage changes and/or continue medication(s): continue current medications as prescribed.  B.  N/A.    Target Date: 3 months - 5/3/2025  Person/Persons Responsible for Completion of Goal: YASMANY Salvador  Progress Towards Goals: Continuing Treatment  Treatment Modality: medication management every 3 months  Review due 6 months from date of this plan: 6 months - 8/3/2025  Expected length of service: maintenance unless revised  My Physician/PA/NP and I have developed this plan together and I agree to work on the goals and objectives. I understand the treatment goals that were developed for my treatment.

## 2025-02-04 NOTE — PSYCH
Virtual Regular Visit    Verification of patient location:    Patient is located at Home in the following state in which I hold an active license [place]      Assessment/Plan:    Problem List Items Addressed This Visit    None  Visit Diagnoses         Attention deficit hyperactivity disorder (ADHD), predominantly inattentive type    -  Primary    Relevant Medications    ALPRAZolam (XANAX) 2 MG tablet    methylphenidate (CONCERTA) 36 MG ER tablet      Recurrent major depressive disorder, in partial remission (HCC)        Relevant Medications    ALPRAZolam (XANAX) 2 MG tablet    methylphenidate (CONCERTA) 36 MG ER tablet      NHAN (generalized anxiety disorder)        Relevant Medications    ALPRAZolam (XANAX) 2 MG tablet    methylphenidate (CONCERTA) 36 MG ER tablet            Goals addressed in session: Maintain current level of stability    Depression Follow-up Plan Completed: Not applicable    Reason for visit is   Chief Complaint   Patient presents with    ADHD    Anxiety    Medication Management    Follow-up    Virtual Regular Visit          Encounter provider YASMANY Holt      Recent Visits  Date Type Provider Dept   01/31/25 Telephone YASMANY Holt [facility]   01/31/25 Telephone YASMANY Holt  [facility] [place]   Showing recent visits within past 7 days and meeting all other requirements  Today's Visits  Date Type Provider Dept   02/03/25 Telephone YASMANY Holt [facility]   02/03/25 Telemedicine YASMANY Holt [facility] [place]   Showing today's visits and meeting all other requirements  Future Appointments  No visits were found meeting these conditions.  Showing future appointments within next 150 days and meeting all other requirements       The patient was identified by name and date of birth. Layne Goodman was informed that this is a telemedicine visit and that [facility]. She agrees to proceed..  My office door was closed. No one else was in the room.  She acknowledged consent and understanding of privacy and security of the video platform. The patient has agreed to participate and understands they can discontinue the visit at any time.    Patient is aware this is a billable service.     Subjective  Layne Goodman is a 64 y.o. female has a history of generalized anxiety disorder, attention deficit disorder and depressive disorder but very mild and not treating.  She received significant benefit from Concerta.  As a matter fact, she is asking for us to reduce the Concerta.  She may not need his higher dose and she is experiencing some mild \"anxiety \"and thinks it may be from the 54 mg.  She continues on alprazolam and it works very well for her.  He gets her through her day every day.  She has very high anxiety and has difficulty controlling it but is functioning at her optimum on it.  She never abuses it so there is no need to adjust it.  She is very content with her life.  She now has 3 grandchildren and is enjoying them immensely.  She will continue on this current medication regimen and follow-up with me in 6 months or sooner if necessary  Mental status exam: Patient is awake and alert and oriented x 3.  Mood is stable.  Patient is not suicidal, not homicidal and not psychotic.  Associations are intact.  No overt delusions.  Speech is clear and thoughts are well-organized, coherent and goal-directed.  Attention span is good with the Concerta.  Impulse control is good.  Judgment and insight are good.  Memory is good.  The patient will continue on:  Xanax 2 mg twice daily as needed for anxiety  Reduce Concerta 36 mg daily  Follow-up with me in 6 months or sooner if necessary    We have discussed their safety plan and pt agrees that if they experience unsafe thoughts that they will reach out to their supports including this office, the suicide hotline, and emergency " services if necessary. .      Assessment & Plan  Recurrent major depressive disorder, in partial remission (HCC)  On no medication for depression    Attention deficit hyperactivity disorder (ADHD), predominantly inattentive type  Concerta 36 mg daily       NHAN (generalized anxiety disorder)  Xanax 2 mg twice daily as needed           Past Medical History:   Diagnosis Date    Anxiety     Depression        Past Surgical History:   Procedure Laterality Date     SECTION      FRACTURE SURGERY      HERNIA REPAIR         Current Outpatient Medications   Medication Sig Dispense Refill    ALPRAZolam (XANAX) 2 MG tablet 1 BID 60 tablet 5    methylphenidate (CONCERTA) 36 MG ER tablet Take 1 tablet (36 mg total) by mouth daily Max Daily Amount: 36 mg 30 tablet 0     No current facility-administered medications for this visit.        No Known Allergies    Review of Systems    Video Exam    There were no vitals filed for this visit.    Physical Exam     Visit Time    Visit Start Time: 1:00p  Visit Stop Time: 1:30p  Total Visit Duration: 30 minutes were spent in the visit.  Time was spent reviewing the treatment plan, reviewing medications, ordering medications and completing the progress note.

## 2025-03-17 DIAGNOSIS — F33.41 RECURRENT MAJOR DEPRESSIVE DISORDER, IN PARTIAL REMISSION (HCC): ICD-10-CM

## 2025-03-17 RX ORDER — METHYLPHENIDATE HYDROCHLORIDE 36 MG/1
36 TABLET ORAL DAILY
Qty: 30 TABLET | Refills: 0 | Status: SHIPPED | OUTPATIENT
Start: 2025-03-17

## 2025-03-17 NOTE — TELEPHONE ENCOUNTER
Reason for call:   [x] Refill   [] Prior Auth  [] Other:     Office:   [] PCP/Provider -   [x] Specialty/Provider -  YASMANY Holt     Medication: (CONCERTA) 36 MG ER     Dose/Frequency: 1 tab daily    Quantity: 30 tabs    Pharmacy: [id number] - [place] - [address] [place] [address] Pharmacy   Does the patient have enough for 3 days?   [] Yes   [x] No - Send as HP to POD    [facility] Pharmacy   Does the patient have enough for 10 days?   [] Yes   [] No - Send as HP to POD

## 2025-03-17 NOTE — TELEPHONE ENCOUNTER
02/26/2025 02/03/2025 ALPRAZolam (Tablet) 60.0 30 2 MG NA [employer]  [employer]. [place] / 5 [place]   1 [id number] 02/17/2025 02/03/2025 Methylphenidate Hcl (Tablet, Extended Release) 30.0 30 36 MG NA [employer]  [employer]. [place] 0 / 0 [place]   1 [id number] 01/27/2025 09/12/2024 ALPRAZolam (Tablet) 60.0 30 2 MG NA [employer]. Commercial Insurance 5 / 5 [place]   1 [id number] 01/14/2025 01/14/2025 Methylphenidate Hcl (Tablet, Extended Release) 30.0 30 54 MG NA [employer]. Commercial Insurance 0 / 0 [place]   1 [id number] 12/30/2024 09/12/2024 ALPRAZolam (Tablet) 60.0 30 2 MG NA [employer]. Commercial Insurance 4 / 5 [place]   1 [id number] 12/05/2024 09/12/2024 ALPRAZolam (Tablet) 60.0 30 2 MG NA [employer]. Commercial Insurance 3 / 5 [place]   1 [id number] 11/26/2024 11/26/2024 Methylphenidate Hcl (Tablet, Extended Release) 30.0 30 54 MG NA [employer]. Commercial Insurance 0 / 0 [place]   1 [id number] 11/08/2024 09/12/2024 ALPRAZolam (Tablet) 60.0 30 2 MG NA [employer]. Commercial Insurance 2 / 5 [place]   1 [id number] 10/24/2024 10/24/2024 Methylphenidate Hcl (Tablet, E

## 2025-04-15 DIAGNOSIS — F33.41 RECURRENT MAJOR DEPRESSIVE DISORDER, IN PARTIAL REMISSION (HCC): ICD-10-CM

## 2025-04-15 NOTE — TELEPHONE ENCOUNTER
Reason for call:   [x] Refill   [] Prior Auth  [] Other:     Office:   [] PCP/Provider -   [x] Specialty/Provider - Charisse Casas     Medication: methylphenidate (CONCERTA) 36 MG ER tablet     Dose/Frequency: Take 1 tablet (36 mg total) by mouth daily Max Daily Amount: 36 mg     Quantity: 30    Pharmacy: [id number]     Local Pharmacy   Does the patient have enough for 3 days?   [x] Yes   [] No - Send as HP to POD

## 2025-04-16 ENCOUNTER — TELEPHONE (OUTPATIENT)
Dept: PSYCHIATRY | Facility: CLINIC | Age: 65
End: 2025-04-16

## 2025-04-16 RX ORDER — METHYLPHENIDATE HYDROCHLORIDE 36 MG/1
36 TABLET ORAL DAILY
Qty: 30 TABLET | Refills: 0 | Status: SHIPPED | OUTPATIENT
Start: 2025-04-16

## 2025-04-16 NOTE — TELEPHONE ENCOUNTER
PA for methylphenidate (CONCERTA) 36 MG ER tablet SUBMITTED to [employer]     via    []CMM-KEY:   [x]Surescripts  []Availity-Auth ID # NDC #   []Faxed to plan   []Other website   []Phone call Case ID #     []PA sent as URGENT    All office notes, labs and other pertaining documents and studies sent. Clinical questions answered. Awaiting determination from insurance company.     Turnaround time for your insurance to make a decision on your Prior Authorization can take 7-21 business days.

## 2025-04-16 NOTE — TELEPHONE ENCOUNTER
Refill cannot be delegated    1 [id number] 03/24/2025 02/03/2025 ALPRAZolam (Tablet) 60.0 30 2 MG NA [employer]. Commercial Insurance 1 / 5 [place]   1 [id number] 03/18/2025 03/17/2025 Methylphenidate Hcl (Tablet, Extended Release) 30.0 30 36 MG NA [employer]. [place] Insurance 0 / 0 [place]   1 [id number] 02/26/2025 02/03/2025 ALPRAZolam (Tablet) 60.0 30 2 MG NA [employer] Commercial Insurance 0 / 5 [place]   1 [id number] 02/17/2025 02/03/2025 Methylphenidate Hcl (Tablet, Extended Release) 30.0 30 36 MG NA [employer] Commercial Insurance 0 / 0 [place]   1 [id number] 01/27/2025 09/12/2024 ALPRAZolam (Tablet) 60.0 30 2 MG NA [employer] Commercial Insurance 5 / 5 [place]   1 [id number] 01/14/2025 01/14/2025 Methylphenidate Hcl (Tablet, Extended Release) 30.0 30 54 MG NA [employer]. Commercial Insurance 0 / 0 [place]   1 [id number] 12/30/2024 09/12/2024 ALPRAZolam (Tablet) 60.0 30 2 MG NA [employer]. Commercial Insurance 4 / 5 [place]   1 [id number] 12/05/2024 09/12/2024 ALPRAZolam (Tablet) 60.0 30 2 MG NA [employer]. Commercial Insurance 3 / 5 [place]   1 [id number] 11/26/2024 11/26/2024 Methylphenidate Hcl (Tablet, Extended Release) 30.0 30 54 MG NA [employer]. Commercial Insurance 0 / 0 [place]   1 [id number] 11/08/2024 09/12/2024 ALPRAZolam (Tablet) 60.0 30 2 MG NA [facility] Commercial Insurance 2 / 5 [place]

## 2025-04-21 NOTE — TELEPHONE ENCOUNTER
Pt's PA for Concerta 36 mg was denied, we never received a denial letter called [facility] [employer] for denial letter. Fax number was given to the representative Collette who was faxing that over. Will await the denial letter.

## 2025-04-28 ENCOUNTER — TELEPHONE (OUTPATIENT)
Age: 65
End: 2025-04-28

## 2025-04-28 NOTE — TELEPHONE ENCOUNTER
Patient's pharmacy called and requested to speak with nurse to discuss clarification on a script. Writer transferred them to nurse.

## 2025-05-15 DIAGNOSIS — F33.41 RECURRENT MAJOR DEPRESSIVE DISORDER, IN PARTIAL REMISSION (HCC): ICD-10-CM

## 2025-05-15 NOTE — TELEPHONE ENCOUNTER
Reason for call:   [x] Refill   [] Prior Auth  [] Other:     Office:   [] PCP/Provider -   [x] Specialty/Provider - YASMANY Holt /  [facility]     Medication: methylphenidate (CONCERTA) 36 MG ER tablet / Take 1 tablet (36 mg total) by mouth daily     Pharmacy: [id number] - [address] [place] [address]   Does the patient have enough for 3 days?   [] Yes   [x] No - Send as HP to POD

## 2025-05-16 RX ORDER — METHYLPHENIDATE HYDROCHLORIDE 36 MG/1
36 TABLET ORAL DAILY
Qty: 30 TABLET | Refills: 0 | Status: SHIPPED | OUTPATIENT
Start: 2025-05-16

## 2025-05-16 NOTE — TELEPHONE ENCOUNTER
04/21/2025 04/21/2025 02/03/2025 ALPRAZolam (Tablet) 60.0 30 2 MG NA [employer] Commercial Insurance 2 / 5 [place]   1 [id number] 04/17/2025 04/16/2025 04/16/2025 Methylphenidate Hcl (Tablet, Extended Release) 30.0 30 36 MG NA [employer]  [employer]. Commercial Insurance 0 / 0 [place]   1 [id number] 03/25/2025 03/24/2025 02/03/2025 ALPRAZolam (Tablet) 60.0 30 2 MG NA "[employer]. Commercial Insurance 1 / 5 [place]   1 [id number] 03/18/2025 03/18/2025 03/17/2025 Methylphenidate Hcl (Tablet, Extended Release) 30.0 30 36 MG NA "[employer]. Commercial Insurance 0 / 0 [place]   1 [id number] 02/26/2025 02/26/2025 02/03/2025 ALPRAZolam (Tablet) 60.0 30 2 MG NA "[employer]. Commercial Insurance 0 / 5 [place]   1 [id number] 02/17/2025 02/17/2025 02/03/2025 Methylphenidate Hcl (Tablet, Extended Release) 30.0 30 36 MG NA [employer]  [employer]. Commercial Insurance 0 / 0 [place]   1 [id number] 01/30/2025 01/27/2025 09/12/2024 ALPRAZolam (Tablet) 60.0 30 2 MG NA "[employer]. Commercial Insurance 5 / 5 [place]   1 [id number] 01/17/2025 01/14/2025 01/14/2025 Methylphenidate Hcl (Tablet, Extended Release) 30.0 30 54 MG NA "[employer]. Commercial Insurance 0 / 0 [place]   1 [id number] 12/31/2024 12/30/2024 09/12/2024 ALPRAZolam (Tablet)

## 2025-06-13 DIAGNOSIS — F33.41 RECURRENT MAJOR DEPRESSIVE DISORDER, IN PARTIAL REMISSION (HCC): ICD-10-CM

## 2025-06-13 NOTE — TELEPHONE ENCOUNTER
Reason for call:   [x] Refill   [] Prior Auth  [] Other:     Office:   [] PCP/Provider -   [x] Specialty/Provider -psych/M Augusto    Medication: Methylphenidate 36mg    Dose/Frequency: 1 tab daily     Quantity: 30    Pharmacy: [id number] - [place] - [address] [facility] [phone]     Local Pharmacy   Does the patient have enough for 3 days?   [x] Yes   [] No - Send as HP to POD

## 2025-06-16 RX ORDER — METHYLPHENIDATE HYDROCHLORIDE 36 MG/1
36 TABLET ORAL DAILY
Qty: 30 TABLET | Refills: 0 | Status: SHIPPED | OUTPATIENT
Start: 2025-06-16

## 2025-06-16 NOTE — TELEPHONE ENCOUNTER
Refill must be reviewed and completed by the office or provider. The refill is unable to be approved or denied by the medication management team.      05/21/2025 05/19/2025 02/03/2025 ALPRAZolam (Tablet) 60.0 30 2 MG NA [employer] Commercial Insurance 3 / 5 [place]   1 [id number] 05/18/2025 05/16/2025 05/16/2025 Methylphenidate Hcl (Tablet, Extended Release) 30.0 30 36 MG NA [employer] Commercial Insurance 0 / 0 [place]   1 [id number] 04/21/2025 04/21/2025 02/03/2025 ALPRAZolam (Tablet) 60.0 30 2 MG NA [employer] Commercial Insurance 2 / 5 P

## 2025-07-15 DIAGNOSIS — F33.41 RECURRENT MAJOR DEPRESSIVE DISORDER, IN PARTIAL REMISSION (HCC): ICD-10-CM

## 2025-07-15 RX ORDER — METHYLPHENIDATE HYDROCHLORIDE 36 MG/1
36 TABLET ORAL DAILY
Qty: 30 TABLET | Refills: 0 | Status: SHIPPED | OUTPATIENT
Start: 2025-07-15

## 2025-07-16 ENCOUNTER — TELEPHONE (OUTPATIENT)
Age: 65
End: 2025-07-16

## 2025-07-16 NOTE — TELEPHONE ENCOUNTER
PA for Concerta 36 mg SUBMITTED to [facility]    via    []CMM-KEY:   [x]Surescripts-Case ID #   []Availity-Auth ID # NDC #   []Faxed to plan   []Other website   []Phone call Case ID #     []PA sent as URGENT    All office notes, labs and other pertaining documents and studies sent. Clinical questions answered. Awaiting determination from insurance company.     Turnaround time for your insurance to make a decision on your Prior Authorization can take 7-21 business days.

## 2025-07-24 NOTE — TELEPHONE ENCOUNTER
Called [employer] for denial letter of Concerta 36mg. Spoke to Rupali who stated she was going to fax over the denial letter

## 2025-07-25 NOTE — TELEPHONE ENCOUNTER
PA for methylphenidate hcl er 36 mg  DENIED    Reason:(Screenshot if applicable)        Message sent to office clinical pool Yes    Denial letter scanned into Media Yes    We can gladly do an appeal but the process can take about 30-60 days to provide determination. Please have the office staff schedule a Peer to Peer at phone [phone] . If an appeal is truly warranted please have Provider send clinical documentation to the PA department to support the appeal.     **Please follow up with your patient regarding denial and next steps**

## 2025-07-29 NOTE — TELEPHONE ENCOUNTER
PA for Methyphenidate 36 mg  APPEALED via     []CMM  []SS  [x]Letter sent to insurance via fax [phone]  []Other site or means     All necessary records sent. Will await response from insurance company    Turnaround time for a decision to be made on an appeal could take up to 30 business days

## 2025-07-31 NOTE — TELEPHONE ENCOUNTER
[place] Appeal for Methylphenidate HCL ER 36  DENIED    Reason:(Screenshot if applicable)        Message sent to office clinical pool Yes    Denial letter scanned into Media Yes

## 2025-08-12 ENCOUNTER — TELEMEDICINE (OUTPATIENT)
Dept: PSYCHIATRY | Facility: CLINIC | Age: 65
End: 2025-08-12
Payer: COMMERCIAL

## 2025-08-13 PROBLEM — F90.1 ADHD (ATTENTION DEFICIT HYPERACTIVITY DISORDER), PREDOMINANTLY HYPERACTIVE IMPULSIVE TYPE: Status: ACTIVE | Noted: 2025-08-13
